# Patient Record
Sex: FEMALE | Race: WHITE | Employment: FULL TIME | URBAN - METROPOLITAN AREA
[De-identification: names, ages, dates, MRNs, and addresses within clinical notes are randomized per-mention and may not be internally consistent; named-entity substitution may affect disease eponyms.]

---

## 2017-02-13 ENCOUNTER — OFFICE VISIT (OUTPATIENT)
Dept: INTERNAL MEDICINE CLINIC | Facility: CLINIC | Age: 57
End: 2017-02-13

## 2017-02-13 VITALS
BODY MASS INDEX: 23.25 KG/M2 | SYSTOLIC BLOOD PRESSURE: 110 MMHG | WEIGHT: 120 LBS | HEART RATE: 71 BPM | OXYGEN SATURATION: 99 % | TEMPERATURE: 98 F | DIASTOLIC BLOOD PRESSURE: 66 MMHG | RESPIRATION RATE: 16 BRPM | HEIGHT: 60.25 IN

## 2017-02-13 DIAGNOSIS — R09.81 CONGESTION OF PARANASAL SINUS: Primary | ICD-10-CM

## 2017-02-13 DIAGNOSIS — J06.9 VIRAL UPPER RESPIRATORY TRACT INFECTION: ICD-10-CM

## 2017-02-13 PROCEDURE — 99213 OFFICE O/P EST LOW 20 MIN: CPT | Performed by: INTERNAL MEDICINE

## 2017-02-13 RX ORDER — GUAIFENESIN AND PSEUDOEPHEDRINE HCL 1200; 120 MG/1; MG/1
1 TABLET, EXTENDED RELEASE ORAL 2 TIMES DAILY
Qty: 28 TABLET | Refills: 0 | Status: SHIPPED | OUTPATIENT
Start: 2017-02-13 | End: 2017-02-27

## 2017-02-13 RX ORDER — MULTIVIT WITH MINERALS/LUTEIN
1000 TABLET ORAL DAILY
COMMUNITY
End: 2018-05-14

## 2017-02-13 RX ORDER — FLUTICASONE PROPIONATE 50 MCG
2 SPRAY, SUSPENSION (ML) NASAL DAILY
Qty: 1 BOTTLE | Refills: 0 | Status: SHIPPED | OUTPATIENT
Start: 2017-02-13 | End: 2017-09-14

## 2017-02-13 NOTE — PATIENT INSTRUCTIONS
Use nasal spray 2 sprays each nostril daily, use after you gently clear your nose at its most watery (usually in morning after moving around or after shower)

## 2017-02-13 NOTE — PROGRESS NOTES
Richard Kamara is a 64year old female  Patient presents with:  Cold      HPI:   Bad cold, very congested and runny, no fever or chills or cough. Head hurts around the eyes, no color to rhinits.    Had a flu last week that got better then got sick all over HEALTH: feels well otherwise  SKIN: denies any unusual skin lesions or rashes  RESPIRATORY: denies shortness of breath with exertion,   CARDIOVASCULAR: denies chest pain on exertion  GI: denies abdominal pain and denies heartburn, change in appetite or bm'

## 2017-08-25 ENCOUNTER — TELEPHONE (OUTPATIENT)
Dept: INTERNAL MEDICINE CLINIC | Facility: CLINIC | Age: 57
End: 2017-08-25

## 2017-09-14 ENCOUNTER — OFFICE VISIT (OUTPATIENT)
Dept: INTERNAL MEDICINE CLINIC | Facility: CLINIC | Age: 57
End: 2017-09-14

## 2017-09-14 VITALS
HEART RATE: 83 BPM | RESPIRATION RATE: 16 BRPM | BODY MASS INDEX: 23.8 KG/M2 | WEIGHT: 119.63 LBS | DIASTOLIC BLOOD PRESSURE: 66 MMHG | TEMPERATURE: 98 F | HEIGHT: 59.5 IN | OXYGEN SATURATION: 98 % | SYSTOLIC BLOOD PRESSURE: 114 MMHG

## 2017-09-14 DIAGNOSIS — Z12.31 ENCOUNTER FOR SCREENING MAMMOGRAM FOR BREAST CANCER: ICD-10-CM

## 2017-09-14 DIAGNOSIS — Z00.00 ROUTINE GENERAL MEDICAL EXAMINATION AT A HEALTH CARE FACILITY: Primary | ICD-10-CM

## 2017-09-14 DIAGNOSIS — M85.80 OSTEOPENIA, UNSPECIFIED LOCATION: ICD-10-CM

## 2017-09-14 DIAGNOSIS — Z12.4 PAP SMEAR FOR CERVICAL CANCER SCREENING: ICD-10-CM

## 2017-09-14 PROCEDURE — 99396 PREV VISIT EST AGE 40-64: CPT | Performed by: INTERNAL MEDICINE

## 2017-09-14 PROCEDURE — 88175 CYTOPATH C/V AUTO FLUID REDO: CPT | Performed by: INTERNAL MEDICINE

## 2017-09-14 PROCEDURE — 87624 HPV HI-RISK TYP POOLED RSLT: CPT | Performed by: INTERNAL MEDICINE

## 2017-09-14 RX ORDER — BUDESONIDE AND FORMOTEROL FUMARATE DIHYDRATE 160; 4.5 UG/1; UG/1
2 AEROSOL RESPIRATORY (INHALATION) 2 TIMES DAILY
Qty: 3 INHALER | Refills: 3 | Status: SHIPPED | OUTPATIENT
Start: 2017-09-14 | End: 2018-05-14

## 2017-09-14 RX ORDER — BUDESONIDE AND FORMOTEROL FUMARATE DIHYDRATE 160; 4.5 UG/1; UG/1
2 AEROSOL RESPIRATORY (INHALATION) 2 TIMES DAILY
COMMUNITY
End: 2017-09-14

## 2017-09-14 NOTE — PROGRESS NOTES
HPI:   Amparo Rojo is a 62year old female who presents for a complete physical exam. .former pt of dr Trixie Gonsalez and emmanuel  She is changing her well woman care to me and is concerned she might have pubic lice since this morning.  One time when she slept with 10/17/2016  Smokeless tobacco: Former User                        Quit date: 10/17/2016  Comment: 5-7 cigarettes per day  Alcohol use: Yes           1.2 - 1.8 oz/week     Standard drinks or equivalent: 2 - 3 per week     Comment: 2-3 beers per week    Occ: tenderness, PAP was done   RECTAL:good rectal tone, stool is OB negative no rectal masses  MUSCULOSKELETAL: back is not tender,FROM of the back  EXTREMITIES: no cyanosis, clubbing or edema, no varicosities or stasis change  NEURO: Oriented times three,cran (SYMBICORT) 160-4.5 MCG/ACT Inhalation Aerosol 3 Inhaler 3      Sig: Inhale 2 puffs into the lungs 2 (two) times daily. Imaging & Consults:  XR DEXA BONE DENSITOMETRY (CPT=77080)  BRET SCREENING BILAT (CPT=77067)    No Follow-up on file.

## 2017-09-18 ENCOUNTER — LABORATORY ENCOUNTER (OUTPATIENT)
Dept: LAB | Age: 57
End: 2017-09-18
Attending: INTERNAL MEDICINE
Payer: COMMERCIAL

## 2017-09-18 ENCOUNTER — HOSPITAL ENCOUNTER (OUTPATIENT)
Dept: BONE DENSITY | Age: 57
Discharge: HOME | End: 2017-09-18
Attending: INTERNAL MEDICINE
Payer: COMMERCIAL

## 2017-09-18 ENCOUNTER — HOSPITAL ENCOUNTER (OUTPATIENT)
Dept: MAMMOGRAPHY | Age: 57
Discharge: HOME | End: 2017-09-18
Attending: INTERNAL MEDICINE
Payer: COMMERCIAL

## 2017-09-18 DIAGNOSIS — Z00.00 ROUTINE GENERAL MEDICAL EXAMINATION AT A HEALTH CARE FACILITY: ICD-10-CM

## 2017-09-18 DIAGNOSIS — M85.80 OSTEOPENIA, UNSPECIFIED LOCATION: ICD-10-CM

## 2017-09-18 DIAGNOSIS — Z12.31 ENCOUNTER FOR SCREENING MAMMOGRAM FOR BREAST CANCER: ICD-10-CM

## 2017-09-18 LAB
25-HYDROXYVITAMIN D (TOTAL): 28.3 NG/ML (ref 30–100)
ALBUMIN SERPL-MCNC: 3.8 G/DL (ref 3.5–4.8)
ALP LIVER SERPL-CCNC: 41 U/L (ref 46–118)
ALT SERPL-CCNC: 28 U/L (ref 14–54)
AST SERPL-CCNC: 20 U/L (ref 15–41)
BASOPHILS # BLD AUTO: 0.07 X10(3) UL (ref 0–0.1)
BASOPHILS NFR BLD AUTO: 1.2 %
BILIRUB SERPL-MCNC: 0.5 MG/DL (ref 0.1–2)
BUN BLD-MCNC: 22 MG/DL (ref 8–20)
CALCIUM BLD-MCNC: 9.3 MG/DL (ref 8.3–10.3)
CHLORIDE: 110 MMOL/L (ref 101–111)
CHOLEST SMN-MCNC: 232 MG/DL (ref ?–200)
CO2: 26 MMOL/L (ref 22–32)
CREAT BLD-MCNC: 0.87 MG/DL (ref 0.55–1.02)
EOSINOPHIL # BLD AUTO: 0.48 X10(3) UL (ref 0–0.3)
EOSINOPHIL NFR BLD AUTO: 8.1 %
ERYTHROCYTE [DISTWIDTH] IN BLOOD BY AUTOMATED COUNT: 13 % (ref 11.5–16)
GLUCOSE BLD-MCNC: 83 MG/DL (ref 70–99)
HCT VFR BLD AUTO: 39.3 % (ref 34–50)
HDLC SERPL-MCNC: 89 MG/DL (ref 45–?)
HDLC SERPL: 2.61 {RATIO} (ref ?–4.44)
HGB BLD-MCNC: 12.7 G/DL (ref 12–16)
IMMATURE GRANULOCYTE COUNT: 0.02 X10(3) UL (ref 0–1)
IMMATURE GRANULOCYTE RATIO %: 0.3 %
LDLC SERPL CALC-MCNC: 126 MG/DL (ref ?–130)
LDLC SERPL-MCNC: 17 MG/DL (ref 5–40)
LYMPHOCYTES # BLD AUTO: 1.75 X10(3) UL (ref 0.9–4)
LYMPHOCYTES NFR BLD AUTO: 29.7 %
M PROTEIN MFR SERPL ELPH: 7.1 G/DL (ref 6.1–8.3)
MCH RBC QN AUTO: 29.1 PG (ref 27–33.2)
MCHC RBC AUTO-ENTMCNC: 32.3 G/DL (ref 31–37)
MCV RBC AUTO: 90.1 FL (ref 81–100)
MONOCYTES # BLD AUTO: 0.55 X10(3) UL (ref 0.1–0.6)
MONOCYTES NFR BLD AUTO: 9.3 %
NEUTROPHIL ABS PRELIM: 3.03 X10 (3) UL (ref 1.3–6.7)
NEUTROPHILS # BLD AUTO: 3.03 X10(3) UL (ref 1.3–6.7)
NEUTROPHILS NFR BLD AUTO: 51.4 %
NONHDLC SERPL-MCNC: 143 MG/DL (ref ?–130)
PLATELET # BLD AUTO: 340 10(3)UL (ref 150–450)
POTASSIUM SERPL-SCNC: 4 MMOL/L (ref 3.6–5.1)
RBC # BLD AUTO: 4.36 X10(6)UL (ref 3.8–5.1)
RED CELL DISTRIBUTION WIDTH-SD: 43.2 FL (ref 35.1–46.3)
SODIUM SERPL-SCNC: 143 MMOL/L (ref 136–144)
TRIGLYCERIDES: 85 MG/DL (ref ?–150)
TSI SER-ACNC: 2.42 MIU/ML (ref 0.35–5.5)
WBC # BLD AUTO: 5.9 X10(3) UL (ref 4–13)

## 2017-09-18 PROCEDURE — 80053 COMPREHEN METABOLIC PANEL: CPT

## 2017-09-18 PROCEDURE — 82306 VITAMIN D 25 HYDROXY: CPT

## 2017-09-18 PROCEDURE — 85025 COMPLETE CBC W/AUTO DIFF WBC: CPT

## 2017-09-18 PROCEDURE — 84443 ASSAY THYROID STIM HORMONE: CPT

## 2017-09-18 PROCEDURE — 77067 SCR MAMMO BI INCL CAD: CPT | Performed by: INTERNAL MEDICINE

## 2017-09-18 PROCEDURE — 36415 COLL VENOUS BLD VENIPUNCTURE: CPT

## 2017-09-18 PROCEDURE — 77080 DXA BONE DENSITY AXIAL: CPT | Performed by: INTERNAL MEDICINE

## 2017-09-18 PROCEDURE — 80061 LIPID PANEL: CPT

## 2017-09-19 ENCOUNTER — TELEPHONE (OUTPATIENT)
Dept: INTERNAL MEDICINE CLINIC | Facility: CLINIC | Age: 57
End: 2017-09-19

## 2017-09-19 DIAGNOSIS — R79.89 LOW VITAMIN D LEVEL: Primary | ICD-10-CM

## 2017-09-19 NOTE — TELEPHONE ENCOUNTER
Pt had recent exam, needs \"biometric exam\" sent to her insurance, [facility] [place], [place], fax #[phone]. Pt does not have a biometric form for us to complete, asked that we just fax over the labs.  Encouraged pt to contact insurance about the form but pt

## 2017-09-19 NOTE — TELEPHONE ENCOUNTER
When discussing result notes with pt, she mentioned that her deductible is high and Symbicort will cost her 56 with insurance. No samples available at office. Found discount card, phoned in information to pharmacy, zero copay.  May need to adjust medicatio

## 2017-09-20 LAB — HPV I/H RISK 1 DNA SPEC QL NAA+PROBE: NEGATIVE

## 2017-10-03 NOTE — TELEPHONE ENCOUNTER
Patient called and requested we fax current labs and biometric testing to [facility] an office that work along side [employer] (fax # [phone]).   Forms have been faxed and confirmed, however patient was advised that we needed a form to enter in biome

## 2017-10-11 ENCOUNTER — TELEPHONE (OUTPATIENT)
Dept: INTERNAL MEDICINE CLINIC | Facility: CLINIC | Age: 57
End: 2017-10-11

## 2017-10-11 NOTE — TELEPHONE ENCOUNTER
Spoke to pt, answered all questions regarding testing. Wellness form completed, to Dr Candida Durham for review.

## 2017-10-11 NOTE — TELEPHONE ENCOUNTER
Pt would like a call to review her recent lab results. Also, pt needs health screening form filled out and faxed to [phone]. Pt dropped off form and it is in triage.  Thank you

## 2017-10-12 ENCOUNTER — TELEPHONE (OUTPATIENT)
Dept: INTERNAL MEDICINE CLINIC | Facility: CLINIC | Age: 57
End: 2017-10-12

## 2018-03-07 ENCOUNTER — TELEPHONE (OUTPATIENT)
Dept: INTERNAL MEDICINE CLINIC | Facility: CLINIC | Age: 58
End: 2018-03-07

## 2018-03-07 NOTE — TELEPHONE ENCOUNTER
Patient called and stated she got a letter from [address] [place] [address] recommending she go for her Bone DEXA.  Reviewed Dr. Merced Junior note from 09/18/17 on her Vitamin D test result that her Vitamin D was low, and to recheck in three

## 2018-03-08 NOTE — TELEPHONE ENCOUNTER
Left detailed message on pt's voice mail re: advising as below per Dr. Kofi Jolley that Dexa scans are done every 2 years. Pt asked to call back to confirm receipt of message.

## 2018-03-08 NOTE — TELEPHONE ENCOUNTER
Spoke with pt. Advised as below that Dexa scan to be done every 2 years. Pt states that she received a remmnder about a repeat test.  Reminded pt that she is due for lab test Vitamin D. Pt agreeable.

## 2018-04-04 ENCOUNTER — OFFICE VISIT (OUTPATIENT)
Dept: INTERNAL MEDICINE CLINIC | Facility: CLINIC | Age: 58
End: 2018-04-04

## 2018-04-04 VITALS
RESPIRATION RATE: 16 BRPM | TEMPERATURE: 98 F | HEIGHT: 59.5 IN | BODY MASS INDEX: 24.47 KG/M2 | WEIGHT: 123 LBS | HEART RATE: 80 BPM | OXYGEN SATURATION: 98 % | DIASTOLIC BLOOD PRESSURE: 78 MMHG | SYSTOLIC BLOOD PRESSURE: 100 MMHG

## 2018-04-04 DIAGNOSIS — J06.9 ACUTE URI: Primary | ICD-10-CM

## 2018-04-04 DIAGNOSIS — Z12.11 ENCOUNTER FOR SCREENING COLONOSCOPY: ICD-10-CM

## 2018-04-04 PROCEDURE — 99213 OFFICE O/P EST LOW 20 MIN: CPT | Performed by: NURSE PRACTITIONER

## 2018-04-04 RX ORDER — FEXOFENADINE HCL 180 MG/1
180 TABLET ORAL DAILY
COMMUNITY
End: 2020-12-10 | Stop reason: ALTCHOICE

## 2018-04-04 RX ORDER — AMOXICILLIN AND CLAVULANATE POTASSIUM 875; 125 MG/1; MG/1
1 TABLET, FILM COATED ORAL 2 TIMES DAILY
Qty: 20 TABLET | Refills: 0 | Status: SHIPPED | OUTPATIENT
Start: 2018-04-04 | End: 2018-04-14

## 2018-04-04 NOTE — PROGRESS NOTES
Patient presents with:  Cough: Dry  URI  Referral: Colonoscopy      HPI:  Presents with approx 1 week history of sinus congestion, nasal drainage, cough with production of green colored sputum- worse at night, chills- but no fevers, fatigue and sore throat (90 BASE) MCG/ACT Inhalation Aero Soln Inhale 2 puffs into the lungs every 4 (four) hours as needed for Wheezing.  Disp: 1 Inhaler Rfl: 1       Physical Exam  /78 (BP Location: Right arm, Patient Position: Sitting, Cuff Size: adult)   Pulse 80   Temp Nasal wash squeeze bottle, Neti-Pot, Neilmed nasal wash or similar device 3 times daily (in the morning, after work and before bed). These are all available over the counter. Follow manufacturers instructions.  I prefer the \"squeeze bottle\" variety over

## 2018-04-05 ENCOUNTER — TELEPHONE (OUTPATIENT)
Dept: INTERNAL MEDICINE CLINIC | Facility: CLINIC | Age: 58
End: 2018-04-05

## 2018-04-05 NOTE — TELEPHONE ENCOUNTER
Patient saw [facility] yesterday and he prescribed amoxicillin. Since she's been on the medication, she has been nauseous and throwing up. She is wondering if she can cut the tablets in half or should she take something else. Please advise.

## 2018-04-05 NOTE — TELEPHONE ENCOUNTER
Noted below. N&V from amoxicillin prescribed by [facility] yesterday. Is there an alternative abx? Routed to Dr. Damaris Lebron for recommendation.

## 2018-04-05 NOTE — TELEPHONE ENCOUNTER
Called the patient back and informed her that the nausea is a common side effect of the Augmentin per Dr. Vivien Santiago.  Per Dr. Vivien Santiago, she wants her to stop the abx and give it through the weekend to see if it resolves on its own, as it could be a virus and Dr. Abdulaziz Gillespie

## 2018-04-09 NOTE — TELEPHONE ENCOUNTER
Patient called with a report back, patient went to work on Friday. Patient said once she got all that out of her system she felt much better. Patient wants to know if she needs to follow up and or take something OTC wise, please call patient. Thank you.

## 2018-05-21 ENCOUNTER — TELEPHONE (OUTPATIENT)
Dept: INTERNAL MEDICINE CLINIC | Facility: CLINIC | Age: 58
End: 2018-05-21

## 2018-07-24 ENCOUNTER — OFFICE VISIT (OUTPATIENT)
Dept: INTERNAL MEDICINE CLINIC | Facility: CLINIC | Age: 58
End: 2018-07-24

## 2018-07-24 VITALS
WEIGHT: 121.63 LBS | OXYGEN SATURATION: 97 % | TEMPERATURE: 99 F | HEART RATE: 76 BPM | RESPIRATION RATE: 14 BRPM | DIASTOLIC BLOOD PRESSURE: 68 MMHG | HEIGHT: 59.5 IN | SYSTOLIC BLOOD PRESSURE: 110 MMHG | BODY MASS INDEX: 24.2 KG/M2

## 2018-07-24 DIAGNOSIS — M76.51 PATELLAR TENDINITIS OF RIGHT KNEE: Primary | ICD-10-CM

## 2018-07-24 PROCEDURE — 99213 OFFICE O/P EST LOW 20 MIN: CPT | Performed by: INTERNAL MEDICINE

## 2018-07-24 NOTE — PROGRESS NOTES
Angie Gardiner is a 62year old female  Patient presents with:  Knee Pain: was doing yard work, swollen, feels tight, took 1 ibuprofen, feels better when she moves around a bit      HPI:   This morning right iknee pain, got worse as the day went on  Dull t FROM  Slight tenderness over the suprapatellar tendon      ASSESSMENT AND PLAN:   Patellar tendinitis of right knee  (primary encounter diagnosis) - advil, ice, rest, compression/stabilizer    No orders of the defined types were placed in this encounter.

## 2018-07-24 NOTE — PATIENT INSTRUCTIONS
Get a compression stabilizer to wear daytime  Take 2 advil three times daily w/food for 1 week  Ice to the area 3 times daily for 20-30 minutes

## 2018-08-15 PROCEDURE — 87660 TRICHOMONAS VAGIN DIR PROBE: CPT | Performed by: OBSTETRICS & GYNECOLOGY

## 2018-08-15 PROCEDURE — 87510 GARDNER VAG DNA DIR PROBE: CPT | Performed by: OBSTETRICS & GYNECOLOGY

## 2018-08-15 PROCEDURE — 87480 CANDIDA DNA DIR PROBE: CPT | Performed by: OBSTETRICS & GYNECOLOGY

## 2018-08-31 PROCEDURE — 88305 TISSUE EXAM BY PATHOLOGIST: CPT | Performed by: OBSTETRICS & GYNECOLOGY

## 2018-09-04 PROBLEM — N90.4 LICHEN SCLEROSUS ET ATROPHICUS OF THE VULVA: Status: ACTIVE | Noted: 2018-09-04

## 2018-09-04 PROBLEM — L90.0 LICHEN SCLEROSUS: Status: ACTIVE | Noted: 2018-09-04

## 2018-09-04 PROBLEM — L90.0 LICHEN SCLEROSUS: Status: RESOLVED | Noted: 2018-09-04 | Resolved: 2018-09-04

## 2018-09-07 ENCOUNTER — OFFICE VISIT (OUTPATIENT)
Dept: INTERNAL MEDICINE CLINIC | Facility: CLINIC | Age: 58
End: 2018-09-07
Payer: COMMERCIAL

## 2018-09-07 VITALS
SYSTOLIC BLOOD PRESSURE: 116 MMHG | TEMPERATURE: 98 F | HEIGHT: 59.5 IN | HEART RATE: 63 BPM | BODY MASS INDEX: 24.47 KG/M2 | WEIGHT: 123 LBS | OXYGEN SATURATION: 99 % | DIASTOLIC BLOOD PRESSURE: 64 MMHG | RESPIRATION RATE: 14 BRPM

## 2018-09-07 DIAGNOSIS — Z00.00 ROUTINE GENERAL MEDICAL EXAMINATION AT A HEALTH CARE FACILITY: Primary | ICD-10-CM

## 2018-09-07 DIAGNOSIS — Z23 NEED FOR VACCINATION: ICD-10-CM

## 2018-09-07 PROCEDURE — 90471 IMMUNIZATION ADMIN: CPT | Performed by: INTERNAL MEDICINE

## 2018-09-07 PROCEDURE — 90715 TDAP VACCINE 7 YRS/> IM: CPT | Performed by: INTERNAL MEDICINE

## 2018-09-07 PROCEDURE — 99396 PREV VISIT EST AGE 40-64: CPT | Performed by: INTERNAL MEDICINE

## 2018-09-07 NOTE — PROGRESS NOTES
HPI:   Dylan Leonard is a 62year old female who presents for a complete physical exam.   She was referred to derm from her Plastics Dr for a rash right breast.  She said implants fine  She does not use her maintenance asthma inhaler reguarly at all,   Se GENERAL: feels well otherwise  SKIN: denies any unusual skin lesions  EYES:denies blurred vision or double vision  HEENT: denies nasal congestion, sinus pain or ST  LUNGS: she worked w/resins and powders and dust, not currently .  Her asthma recurred 2014 Oriented times three,cranial nerves are intact,motor and sensory are grossly intact, DTR's b/l equal,      Results for orders placed or performed in visit on 08/31/18  -PATHOLOGY TISSUE P   Result Value Ref Range   Case Report Surgical Pathology o'clock,   received in formalin:  Specimen consists of one piece of white-tan shave   skin biopsy measuring 0.6 x 0.4 x 0.1 cm.   The specimen is submitted in   toto in cassette B.   ZQ/tjf   [FORMATTING REMOVED]          ASSESSMENT AND PLAN:   Ghazala Erickson

## 2018-09-20 ENCOUNTER — TELEPHONE (OUTPATIENT)
Dept: INTERNAL MEDICINE CLINIC | Facility: CLINIC | Age: 58
End: 2018-09-20

## 2018-09-20 ENCOUNTER — LAB ENCOUNTER (OUTPATIENT)
Dept: LAB | Age: 58
End: 2018-09-20
Attending: INTERNAL MEDICINE
Payer: COMMERCIAL

## 2018-09-20 DIAGNOSIS — Z00.00 ROUTINE GENERAL MEDICAL EXAMINATION AT A HEALTH CARE FACILITY: ICD-10-CM

## 2018-09-20 PROBLEM — E78.00 HIGH CHOLESTEROL: Status: ACTIVE | Noted: 2018-09-20

## 2018-09-20 LAB
ALBUMIN SERPL-MCNC: 3.8 G/DL (ref 3.5–4.8)
ALBUMIN/GLOB SERPL: 1.1 {RATIO} (ref 1–2)
ALP LIVER SERPL-CCNC: 46 U/L (ref 46–118)
ALT SERPL-CCNC: 23 U/L (ref 14–54)
ANION GAP SERPL CALC-SCNC: 5 MMOL/L (ref 0–18)
AST SERPL-CCNC: 23 U/L (ref 15–41)
BASOPHILS # BLD AUTO: 0.05 X10(3) UL (ref 0–0.1)
BASOPHILS NFR BLD AUTO: 0.9 %
BILIRUB SERPL-MCNC: 0.3 MG/DL (ref 0.1–2)
BUN BLD-MCNC: 10 MG/DL (ref 8–20)
BUN/CREAT SERPL: 10.3 (ref 10–20)
CALCIUM BLD-MCNC: 9.1 MG/DL (ref 8.3–10.3)
CHLORIDE SERPL-SCNC: 108 MMOL/L (ref 101–111)
CHOLEST SMN-MCNC: 266 MG/DL (ref ?–200)
CO2 SERPL-SCNC: 29 MMOL/L (ref 22–32)
CREAT BLD-MCNC: 0.97 MG/DL (ref 0.55–1.02)
EOSINOPHIL # BLD AUTO: 0.29 X10(3) UL (ref 0–0.3)
EOSINOPHIL NFR BLD AUTO: 5.3 %
ERYTHROCYTE [DISTWIDTH] IN BLOOD BY AUTOMATED COUNT: 13.3 % (ref 11.5–16)
GLOBULIN PLAS-MCNC: 3.5 G/DL (ref 2.5–4)
GLUCOSE BLD-MCNC: 80 MG/DL (ref 70–99)
HCT VFR BLD AUTO: 44.3 % (ref 34–50)
HDLC SERPL-MCNC: 94 MG/DL (ref 40–59)
HGB BLD-MCNC: 14 G/DL (ref 12–16)
IMMATURE GRANULOCYTE COUNT: 0.01 X10(3) UL (ref 0–1)
IMMATURE GRANULOCYTE RATIO %: 0.2 %
LDLC SERPL CALC-MCNC: 157 MG/DL (ref ?–100)
LYMPHOCYTES # BLD AUTO: 1.34 X10(3) UL (ref 0.9–4)
LYMPHOCYTES NFR BLD AUTO: 24.6 %
M PROTEIN MFR SERPL ELPH: 7.3 G/DL (ref 6.1–8.3)
MCH RBC QN AUTO: 29.3 PG (ref 27–33.2)
MCHC RBC AUTO-ENTMCNC: 31.6 G/DL (ref 31–37)
MCV RBC AUTO: 92.7 FL (ref 81–100)
MONOCYTES # BLD AUTO: 0.49 X10(3) UL (ref 0.1–1)
MONOCYTES NFR BLD AUTO: 9 %
NEUTROPHIL ABS PRELIM: 3.27 X10 (3) UL (ref 1.3–6.7)
NEUTROPHILS # BLD AUTO: 3.27 X10(3) UL (ref 1.3–6.7)
NEUTROPHILS NFR BLD AUTO: 60 %
NONHDLC SERPL-MCNC: 172 MG/DL (ref ?–130)
OSMOLALITY SERPL CALC.SUM OF ELEC: 292 MOSM/KG (ref 275–295)
PLATELET # BLD AUTO: 395 10(3)UL (ref 150–450)
POTASSIUM SERPL-SCNC: 4.4 MMOL/L (ref 3.6–5.1)
RBC # BLD AUTO: 4.78 X10(6)UL (ref 3.8–5.1)
RED CELL DISTRIBUTION WIDTH-SD: 45.5 FL (ref 35.1–46.3)
SODIUM SERPL-SCNC: 142 MMOL/L (ref 136–144)
TRIGL SERPL-MCNC: 73 MG/DL (ref 30–149)
TSI SER-ACNC: 2.54 MIU/ML (ref 0.35–5.5)
VIT D+METAB SERPL-MCNC: 36.4 NG/ML (ref 30–100)
VLDLC SERPL CALC-MCNC: 15 MG/DL (ref 0–30)
WBC # BLD AUTO: 5.5 X10(3) UL (ref 4–13)

## 2018-09-20 PROCEDURE — 85025 COMPLETE CBC W/AUTO DIFF WBC: CPT

## 2018-09-20 PROCEDURE — 80061 LIPID PANEL: CPT

## 2018-09-20 PROCEDURE — 36415 COLL VENOUS BLD VENIPUNCTURE: CPT

## 2018-09-20 PROCEDURE — 82306 VITAMIN D 25 HYDROXY: CPT

## 2018-09-20 PROCEDURE — 80053 COMPREHEN METABOLIC PANEL: CPT

## 2018-09-20 PROCEDURE — 84443 ASSAY THYROID STIM HORMONE: CPT

## 2018-09-20 NOTE — TELEPHONE ENCOUNTER
Patient dropped off form to be filled out then signed by Dr. Nasreen Sheridan for her wellness program through work, and faxed to [phone]. Labs from this morning, 09/20/18, need to be recorded on the form, but are still in process.  Placed the form in the phone

## 2018-09-20 NOTE — TELEPHONE ENCOUNTER
Incoming (mail or fax):  Pt dropped off forms in office after completing her labs   Received from:  Jaylen[employer] Physician Results Form  Documentation given to:  Left paperwork in triage incoming bin    FYI - Pt is aware of $25 form fee b

## 2018-09-25 ENCOUNTER — HOSPITAL ENCOUNTER (OUTPATIENT)
Dept: MAMMOGRAPHY | Facility: HOSPITAL | Age: 58
Discharge: HOME | End: 2018-09-25
Attending: OBSTETRICS & GYNECOLOGY
Payer: COMMERCIAL

## 2018-09-25 DIAGNOSIS — Z12.39 ENCOUNTER FOR SCREENING FOR MALIGNANT NEOPLASM OF BREAST: ICD-10-CM

## 2018-09-25 DIAGNOSIS — Z12.31 BREAST CANCER SCREENING BY MAMMOGRAM: ICD-10-CM

## 2018-09-25 PROCEDURE — 77067 SCR MAMMO BI INCL CAD: CPT | Performed by: OBSTETRICS & GYNECOLOGY

## 2018-09-25 PROCEDURE — 77063 BREAST TOMOSYNTHESIS BI: CPT | Performed by: OBSTETRICS & GYNECOLOGY

## 2018-09-26 NOTE — TELEPHONE ENCOUNTER
Labs entered on form, routed to Dr Remigio Lott for signature. There is also section for pt to sign as well but didn't. Copy and send to scan after signature. Notify pt.

## 2018-09-27 NOTE — TELEPHONE ENCOUNTER
Called the patient to tell her that Dr. Milena Cagle signed her form, but she needs to also sign it before we fax it. The patient will stop by to sign the form, and we can fax it after that.  Will hold encounter open to document when the patient signs it and we fa

## 2018-09-27 NOTE — TELEPHONE ENCOUNTER
Patient came in to sign the form. Faxed to [employer] at [phone] and placed in scanning bin. Fax confirmation received.

## 2018-10-04 ENCOUNTER — TELEPHONE (OUTPATIENT)
Dept: INTERNAL MEDICINE CLINIC | Facility: CLINIC | Age: 58
End: 2018-10-04

## 2018-10-04 NOTE — TELEPHONE ENCOUNTER
Left detailed message that results were normal per BE and advised to follow up with Dr Noris Ray if she has any further questions.

## 2018-10-04 NOTE — TELEPHONE ENCOUNTER
Patient called in and would like a nurse to call her back regarding her mammogram that was completed on September 25th. Patient has not received any information on the results yet and is a bit concerned. Please advise patient. Thank you!

## 2018-10-30 NOTE — TELEPHONE ENCOUNTER
Pt phoned, needs form re faxed with correct date of lab draw. Please include Doctor signature again. Fax to [phone].  Thank you

## 2018-10-30 NOTE — TELEPHONE ENCOUNTER
The patient states the [facility] rejected the fax, because the date of the labs was not indicated on the fax. It does not look like the fax was ever scanned into her file, even though it is indicated it was in the below documentation.  I asked the patient to ha

## 2018-10-30 NOTE — TELEPHONE ENCOUNTER
Pt returned call, states that [employer] is unable to fax the form to us due to [employer]. Please re fax to [phone]. Form must have date of draw on it.  Thank you

## 2018-10-30 NOTE — TELEPHONE ENCOUNTER
Noted below, but we don't have the original form. Left a detailed message on the patient's VM (okay per HIPAA) to request they send her a new form then she can either fax it to us or bring it in.

## 2018-11-01 ENCOUNTER — TELEPHONE (OUTPATIENT)
Dept: INTERNAL MEDICINE CLINIC | Facility: CLINIC | Age: 58
End: 2018-11-01

## 2018-11-01 NOTE — TELEPHONE ENCOUNTER
Incoming (mail or fax): Physician Results Form [facility]   Received from: Patient dropped off form in office   Documentation given to: Triage/Shanti

## 2018-11-01 NOTE — TELEPHONE ENCOUNTER
Patient dropped form off again at our office. She would like filled out and faxed to the number provided on form. Filled out, Routed to Dr. Nikole Carr for signature.

## 2018-11-01 NOTE — TELEPHONE ENCOUNTER
Spoke to patient and informed her that the form is signed and has been faxed to number provided. Informed patient a copy of completed form will be mailed to her for her records.

## 2019-08-01 NOTE — Clinical Note
Date: 2/13/2017    Patient Name: Allie Henson          To Whom it may concern: This letter has been written at the patient's request. The above patient was seen at the [facility] for treatment of a medical condition.     This patient shou Monitor for lightheadedness, BP recheck in 2-3 weeks.

## 2019-09-12 ENCOUNTER — OFFICE VISIT (OUTPATIENT)
Dept: INTERNAL MEDICINE CLINIC | Facility: CLINIC | Age: 59
End: 2019-09-12
Payer: COMMERCIAL

## 2019-09-12 VITALS
SYSTOLIC BLOOD PRESSURE: 110 MMHG | DIASTOLIC BLOOD PRESSURE: 70 MMHG | WEIGHT: 121.88 LBS | TEMPERATURE: 98 F | HEART RATE: 86 BPM | BODY MASS INDEX: 23.93 KG/M2 | RESPIRATION RATE: 16 BRPM | HEIGHT: 59.72 IN | OXYGEN SATURATION: 98 %

## 2019-09-12 DIAGNOSIS — J45.20 MILD INTERMITTENT ASTHMA WITHOUT COMPLICATION: ICD-10-CM

## 2019-09-12 DIAGNOSIS — Z00.00 ROUTINE GENERAL MEDICAL EXAMINATION AT A HEALTH CARE FACILITY: Primary | ICD-10-CM

## 2019-09-12 DIAGNOSIS — L20.84 INTRINSIC ECZEMA: ICD-10-CM

## 2019-09-12 DIAGNOSIS — E78.00 HIGH CHOLESTEROL: ICD-10-CM

## 2019-09-12 PROCEDURE — 99396 PREV VISIT EST AGE 40-64: CPT | Performed by: INTERNAL MEDICINE

## 2019-09-12 NOTE — PROGRESS NOTES
HPI:   Princess Thomson is a 61year old female who presents for a complete physical exam.     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  : single, 2 grown children, no sexual partner , LMP:NA , Exercise regular     REVIEW OF SYSTEMS:   GENERAL: feels well otherwise  SKIN:see HPI  EYES:denies blurred vision or double vision  HEENT: denies nasal congestion, si b/l equal,      ASSESSMENT AND PLAN:   Dylan Leonard is a 61year old female who presents for a complete physical exam.  Pap and pelvic done. Order put in for mammogram and dexascan.   Appropriate lab testing ordered, instructions given for healthy living

## 2019-09-17 ENCOUNTER — APPOINTMENT (OUTPATIENT)
Dept: LAB | Age: 59
End: 2019-09-17
Attending: INTERNAL MEDICINE
Payer: COMMERCIAL

## 2019-09-17 LAB
ALBUMIN SERPL-MCNC: 3.8 G/DL (ref 3.4–5)
ALBUMIN/GLOB SERPL: 1.3 {RATIO} (ref 1–2)
ALP LIVER SERPL-CCNC: 41 U/L (ref 46–118)
ALT SERPL-CCNC: 18 U/L (ref 13–56)
ANION GAP SERPL CALC-SCNC: 4 MMOL/L (ref 0–18)
AST SERPL-CCNC: 18 U/L (ref 15–37)
BILIRUB SERPL-MCNC: 0.4 MG/DL (ref 0.1–2)
BUN BLD-MCNC: 16 MG/DL (ref 7–18)
BUN/CREAT SERPL: 17.6 (ref 10–20)
CALCIUM BLD-MCNC: 9.7 MG/DL (ref 8.5–10.1)
CHLORIDE SERPL-SCNC: 107 MMOL/L (ref 98–112)
CHOLEST SMN-MCNC: 245 MG/DL (ref ?–200)
CO2 SERPL-SCNC: 29 MMOL/L (ref 21–32)
CREAT BLD-MCNC: 0.91 MG/DL (ref 0.55–1.02)
GLOBULIN PLAS-MCNC: 2.9 G/DL (ref 2.8–4.4)
GLUCOSE BLD-MCNC: 89 MG/DL (ref 70–99)
HDLC SERPL-MCNC: 76 MG/DL (ref 40–59)
LDLC SERPL CALC-MCNC: 151 MG/DL (ref ?–100)
M PROTEIN MFR SERPL ELPH: 6.7 G/DL (ref 6.4–8.2)
NONHDLC SERPL-MCNC: 169 MG/DL (ref ?–130)
OSMOLALITY SERPL CALC.SUM OF ELEC: 291 MOSM/KG (ref 275–295)
POTASSIUM SERPL-SCNC: 4.1 MMOL/L (ref 3.5–5.1)
SODIUM SERPL-SCNC: 140 MMOL/L (ref 136–145)
TRIGL SERPL-MCNC: 90 MG/DL (ref 30–149)
VLDLC SERPL CALC-MCNC: 18 MG/DL (ref 0–30)

## 2019-09-17 PROCEDURE — 36415 COLL VENOUS BLD VENIPUNCTURE: CPT | Performed by: INTERNAL MEDICINE

## 2019-09-17 PROCEDURE — 80061 LIPID PANEL: CPT | Performed by: INTERNAL MEDICINE

## 2019-09-17 PROCEDURE — 80053 COMPREHEN METABOLIC PANEL: CPT | Performed by: INTERNAL MEDICINE

## 2019-09-18 NOTE — PROGRESS NOTES
Spoke to pt, aware of results & recommendations. Pt voiced understanding. Ordered repeat CMP & FLP in 1 year.

## 2019-09-23 ENCOUNTER — TELEPHONE (OUTPATIENT)
Dept: INTERNAL MEDICINE CLINIC | Facility: CLINIC | Age: 59
End: 2019-09-23

## 2019-09-24 NOTE — TELEPHONE ENCOUNTER
Documents have been Signed, Faxed and Confirmation Received. Sent to Scan. Patient was notified of form and requested to have copy mailed to her.

## 2019-10-02 ENCOUNTER — HOSPITAL ENCOUNTER (OUTPATIENT)
Dept: MAMMOGRAPHY | Age: 59
Discharge: HOME | End: 2019-10-02
Attending: OBSTETRICS & GYNECOLOGY
Payer: COMMERCIAL

## 2019-10-02 DIAGNOSIS — Z12.31 ENCOUNTER FOR SCREENING MAMMOGRAM FOR MALIGNANT NEOPLASM OF BREAST: ICD-10-CM

## 2019-10-02 PROCEDURE — 77067 SCR MAMMO BI INCL CAD: CPT | Performed by: OBSTETRICS & GYNECOLOGY

## 2019-10-02 PROCEDURE — 77063 BREAST TOMOSYNTHESIS BI: CPT | Performed by: OBSTETRICS & GYNECOLOGY

## 2020-01-31 ENCOUNTER — TELEPHONE (OUTPATIENT)
Dept: INTERNAL MEDICINE CLINIC | Facility: CLINIC | Age: 60
End: 2020-01-31

## 2020-01-31 ENCOUNTER — OFFICE VISIT (OUTPATIENT)
Dept: INTERNAL MEDICINE CLINIC | Facility: CLINIC | Age: 60
End: 2020-01-31
Payer: COMMERCIAL

## 2020-01-31 VITALS
RESPIRATION RATE: 16 BRPM | HEART RATE: 103 BPM | OXYGEN SATURATION: 98 % | DIASTOLIC BLOOD PRESSURE: 64 MMHG | BODY MASS INDEX: 23.36 KG/M2 | TEMPERATURE: 98 F | SYSTOLIC BLOOD PRESSURE: 110 MMHG | HEIGHT: 59.72 IN | WEIGHT: 119 LBS

## 2020-01-31 DIAGNOSIS — H11.422 CHEMOSIS OF LEFT CONJUNCTIVA: Primary | ICD-10-CM

## 2020-01-31 DIAGNOSIS — H15.9 SCLERAL LESION: ICD-10-CM

## 2020-01-31 PROCEDURE — 99213 OFFICE O/P EST LOW 20 MIN: CPT | Performed by: INTERNAL MEDICINE

## 2020-01-31 RX ORDER — TOBRAMYCIN 3 MG/ML
1 SOLUTION/ DROPS OPHTHALMIC 3 TIMES DAILY
Qty: 5 ML | Refills: 0 | Status: SHIPPED | OUTPATIENT
Start: 2020-01-31 | End: 2020-12-10 | Stop reason: ALTCHOICE

## 2020-01-31 RX ORDER — CLOBETASOL PROPIONATE 0.5 MG/G
OINTMENT TOPICAL
Qty: 1 TUBE | Refills: 0 | Status: CANCELLED | OUTPATIENT
Start: 2020-01-31

## 2020-01-31 NOTE — PROGRESS NOTES
Radha Gaines is a 61year old female  Patient presents with:  Eye Problem: red, irritated, about a week now      HPI:   Left eye w/focal redness for 10 days  No irritation or drainage, no crusting,      Current Outpatient Medications   Medication Sig Dis BMI 23.46 kg/m²   GENERAL: well developed, well nourished,in no apparent distress  Left eye w/nasal side chemosis, some injection, no drainage, crusting. No flare.          ASSESSMENT AND PLAN:   Scleral lesion  Chemosis of left conjunctiva  (primary encoun

## 2020-02-06 ENCOUNTER — TELEPHONE (OUTPATIENT)
Dept: INTERNAL MEDICINE CLINIC | Facility: CLINIC | Age: 60
End: 2020-02-06

## 2020-02-06 NOTE — TELEPHONE ENCOUNTER
Pt did receive the message from  Dr Vivien Santiago and will  Rx tomorrow.   Dr Vivien Santiago wanted Pt to respond and let us know she got the message  Thank you

## 2020-03-19 ENCOUNTER — MED REC SCAN ONLY (OUTPATIENT)
Dept: INTERNAL MEDICINE CLINIC | Facility: CLINIC | Age: 60
End: 2020-03-19

## 2020-08-27 NOTE — PROGRESS NOTES
Attempted to contact patient to prescreen without success, screening will be done at door.   Normal letter

## 2020-12-10 ENCOUNTER — OFFICE VISIT (OUTPATIENT)
Dept: INTERNAL MEDICINE CLINIC | Facility: CLINIC | Age: 60
End: 2020-12-10
Payer: COMMERCIAL

## 2020-12-10 VITALS
OXYGEN SATURATION: 98 % | BODY MASS INDEX: 23.39 KG/M2 | WEIGHT: 119.13 LBS | DIASTOLIC BLOOD PRESSURE: 72 MMHG | HEIGHT: 59.92 IN | RESPIRATION RATE: 14 BRPM | TEMPERATURE: 98 F | HEART RATE: 68 BPM | SYSTOLIC BLOOD PRESSURE: 116 MMHG

## 2020-12-10 DIAGNOSIS — Z78.0 POSTMENOPAUSAL: ICD-10-CM

## 2020-12-10 DIAGNOSIS — J45.20 MILD INTERMITTENT ASTHMA WITHOUT COMPLICATION: ICD-10-CM

## 2020-12-10 DIAGNOSIS — Z12.31 ENCOUNTER FOR SCREENING MAMMOGRAM FOR BREAST CANCER: ICD-10-CM

## 2020-12-10 DIAGNOSIS — E78.00 HIGH CHOLESTEROL: ICD-10-CM

## 2020-12-10 DIAGNOSIS — Z00.00 ROUTINE GENERAL MEDICAL EXAMINATION AT A HEALTH CARE FACILITY: Primary | ICD-10-CM

## 2020-12-10 DIAGNOSIS — Z23 NEED FOR VACCINATION: ICD-10-CM

## 2020-12-10 PROCEDURE — 3074F SYST BP LT 130 MM HG: CPT | Performed by: INTERNAL MEDICINE

## 2020-12-10 PROCEDURE — 3078F DIAST BP <80 MM HG: CPT | Performed by: INTERNAL MEDICINE

## 2020-12-10 PROCEDURE — 3008F BODY MASS INDEX DOCD: CPT | Performed by: INTERNAL MEDICINE

## 2020-12-10 PROCEDURE — 99396 PREV VISIT EST AGE 40-64: CPT | Performed by: INTERNAL MEDICINE

## 2020-12-10 NOTE — PROGRESS NOTES
HPI:   Navaror Burleson is a 61year old female who presents for a complete physical exam.     She does not use her maintenance asthma inhaler reguarly at all,   See ACT- 21            Last Colonoscopy: 5 years Last DEXA: 3 year, osteopenia, stable on D and 2014 . ACT 21  CARDIOVASCULAR: denies chest pain on exertion  GI: denies abdominal pain,denies heartburn, change in bm's, bloody stoolsGU: has recent diagnosis lichen sclerosis by Dr Juliet Echevarria, on treatment and improving,  MUSCULOSKELETAL: denies back pain, ne asthma without complication  Plan: controlled     (Z78.0) Postmenopausal  Plan: XR DEXA BONE DENSITOMETRY (CPT=77080)            (Z12.31) Encounter for screening mammogram for breast cancer  Plan: [facility] 2D+3D SCREENING AUGMT         DTTUB([id number])

## 2020-12-19 ENCOUNTER — HOSPITAL ENCOUNTER (OUTPATIENT)
Dept: MAMMOGRAPHY | Facility: HOSPITAL | Age: 60
Discharge: HOME | End: 2020-12-19
Attending: INTERNAL MEDICINE
Payer: COMMERCIAL

## 2020-12-19 DIAGNOSIS — Z12.31 ENCOUNTER FOR SCREENING MAMMOGRAM FOR BREAST CANCER: ICD-10-CM

## 2020-12-19 PROCEDURE — 77067 SCR MAMMO BI INCL CAD: CPT | Performed by: INTERNAL MEDICINE

## 2020-12-19 PROCEDURE — 77063 BREAST TOMOSYNTHESIS BI: CPT | Performed by: INTERNAL MEDICINE

## 2020-12-27 ENCOUNTER — HOSPITAL ENCOUNTER (OUTPATIENT)
Dept: BONE DENSITY | Age: 60
Discharge: HOME | End: 2020-12-27
Attending: INTERNAL MEDICINE
Payer: COMMERCIAL

## 2020-12-27 DIAGNOSIS — Z78.0 POSTMENOPAUSAL: ICD-10-CM

## 2020-12-27 PROCEDURE — 77080 DXA BONE DENSITY AXIAL: CPT | Performed by: INTERNAL MEDICINE

## 2020-12-28 PROBLEM — M81.0 AGE-RELATED OSTEOPOROSIS WITHOUT CURRENT PATHOLOGICAL FRACTURE: Status: ACTIVE | Noted: 2020-12-28

## 2020-12-29 ENCOUNTER — TELEPHONE (OUTPATIENT)
Dept: INTERNAL MEDICINE CLINIC | Facility: CLINIC | Age: 60
End: 2020-12-29

## 2020-12-29 DIAGNOSIS — M81.0 AGE-RELATED OSTEOPOROSIS WITHOUT CURRENT PATHOLOGICAL FRACTURE: Primary | ICD-10-CM

## 2020-12-29 NOTE — TELEPHONE ENCOUNTER
Pt called and wanted to inform Dr. Francisca Bryant about the intake of vitamins they take, more specifically there vitamin D intake. Pt stated they take 1,000 I.U./1 a day.  Pt also called to see what they could do to be ore proactive with their hip until they can be

## 2020-12-30 NOTE — TELEPHONE ENCOUNTER
Related to 12/27/2020 DEXA result note. See note below. Pt takes Vitamin D 1,000 units daily. Please advise if go ahead with order for Vitamin D level & other recommendations, thanks!

## 2020-12-31 ENCOUNTER — LAB ENCOUNTER (OUTPATIENT)
Dept: LAB | Age: 60
End: 2020-12-31
Attending: INTERNAL MEDICINE
Payer: COMMERCIAL

## 2020-12-31 PROCEDURE — 36415 COLL VENOUS BLD VENIPUNCTURE: CPT | Performed by: INTERNAL MEDICINE

## 2020-12-31 PROCEDURE — 82306 VITAMIN D 25 HYDROXY: CPT | Performed by: INTERNAL MEDICINE

## 2020-12-31 NOTE — TELEPHONE ENCOUNTER
Vit D ordered. Spoke to pt. Advised of test ordered. Scheduled pt for lab appt at [facility] lab at 2:30PM.    Darshan Courser to take extra precaution to prevent falling. [employer] message sent with OP resources. Pt voiced understanding.

## 2021-01-07 NOTE — TELEPHONE ENCOUNTER
Future Appointments   Date Time Provider Odalys Jefferson   1/8/2021 11:40 AM Yue Sutton MD [facility] [address] [place]     Patient has upcoming appointment tomorrow. She is on 1000 units of vitamin D daily.       Patient had blood work done on 12/31

## 2021-01-07 NOTE — TELEPHONE ENCOUNTER
Pt called and said that she is taking 25MCG once a day for Vitamin d. Pt also mentioned since she got blood work done she has hives on her chest and arms.  Sending high priority because hives/allergic reaction    Please advise    Thank you

## 2021-01-08 ENCOUNTER — OFFICE VISIT (OUTPATIENT)
Dept: INTERNAL MEDICINE CLINIC | Facility: CLINIC | Age: 61
End: 2021-01-08
Payer: COMMERCIAL

## 2021-01-08 VITALS
HEIGHT: 59.92 IN | TEMPERATURE: 97 F | RESPIRATION RATE: 14 BRPM | DIASTOLIC BLOOD PRESSURE: 82 MMHG | SYSTOLIC BLOOD PRESSURE: 130 MMHG | BODY MASS INDEX: 23.54 KG/M2 | WEIGHT: 119.88 LBS | HEART RATE: 93 BPM | OXYGEN SATURATION: 99 %

## 2021-01-08 DIAGNOSIS — R79.89 LOW VITAMIN D LEVEL: ICD-10-CM

## 2021-01-08 DIAGNOSIS — M81.0 AGE-RELATED OSTEOPOROSIS WITHOUT CURRENT PATHOLOGICAL FRACTURE: Primary | ICD-10-CM

## 2021-01-08 DIAGNOSIS — L20.84 INTRINSIC ECZEMA: ICD-10-CM

## 2021-01-08 PROCEDURE — 99214 OFFICE O/P EST MOD 30 MIN: CPT | Performed by: INTERNAL MEDICINE

## 2021-01-08 PROCEDURE — 3079F DIAST BP 80-89 MM HG: CPT | Performed by: INTERNAL MEDICINE

## 2021-01-08 PROCEDURE — 3075F SYST BP GE 130 - 139MM HG: CPT | Performed by: INTERNAL MEDICINE

## 2021-01-08 PROCEDURE — 3008F BODY MASS INDEX DOCD: CPT | Performed by: INTERNAL MEDICINE

## 2021-01-08 RX ORDER — BETAMETHASONE DIPROPIONATE 0.05 %
50 OINTMENT (GRAM) TOPICAL DAILY
Qty: 50 G | Refills: 0 | Status: SHIPPED | OUTPATIENT
Start: 2021-01-08 | End: 2021-05-11 | Stop reason: ALTCHOICE

## 2021-01-08 RX ORDER — GLUCOSAMINE HCL 500 MG
1 TABLET ORAL DAILY
COMMUNITY
Start: 2021-01-08

## 2021-01-08 RX ORDER — LATANOPROST 50 UG/ML
1 SOLUTION/ DROPS OPHTHALMIC DAILY
COMMUNITY
Start: 2020-12-19

## 2021-01-08 NOTE — PROGRESS NOTES
Luis Fields is a 61year old female  Patient presents with:  Test Results: Dexa  Rash: Underneath Both Armpits & Chest - Itchy, some blister-like bumps      HPI:   OP: T score -2.5 FN    Medications for osteoporosis:no    no history of fractures  no hei osteoporosis without current pathological fracture     Social History:  Social History    Tobacco Use      Smoking status: Former Smoker        Years: 20.00        Types: Cigarettes        Quit date: 10/17/2016        Years since quittin.2      Smokele Hip T-Score:  -1.1    % young normals:  85    % age matched controls:  80    Change from prior hip examination:  6.1*%           FEMORAL NECK ANALYSIS RESULTS:      Bone mineral density (BMD) (g/cm2):  0.569    Femoral neck T-Score:  -2.5    % young normal bilateral subglandular implants. There is bilateral capsular calcification. Post biopsy changes right breast. Stable parenchymal pattern both breasts.  No suspicious calcifications, spiculated masses or areas of architectural distortion in either breast.

## 2021-02-14 ENCOUNTER — APPOINTMENT (OUTPATIENT)
Dept: GENERAL RADIOLOGY | Age: 61
End: 2021-02-14
Attending: NURSE PRACTITIONER
Payer: COMMERCIAL

## 2021-02-14 ENCOUNTER — HOSPITAL ENCOUNTER (OUTPATIENT)
Age: 61
Discharge: HOME | End: 2021-02-14
Payer: COMMERCIAL

## 2021-02-14 VITALS
RESPIRATION RATE: 18 BRPM | SYSTOLIC BLOOD PRESSURE: 148 MMHG | HEART RATE: 82 BPM | TEMPERATURE: 98 F | DIASTOLIC BLOOD PRESSURE: 89 MMHG | WEIGHT: 119 LBS | HEIGHT: 60 IN | BODY MASS INDEX: 23.36 KG/M2 | OXYGEN SATURATION: 97 %

## 2021-02-14 DIAGNOSIS — S90.414A ABRASION OF FIFTH TOE OF RIGHT FOOT, INITIAL ENCOUNTER: ICD-10-CM

## 2021-02-14 DIAGNOSIS — S93.509A SPRAIN OF TOE, INITIAL ENCOUNTER: Primary | ICD-10-CM

## 2021-02-14 PROCEDURE — 99213 OFFICE O/P EST LOW 20 MIN: CPT | Performed by: NURSE PRACTITIONER

## 2021-02-14 PROCEDURE — 73660 X-RAY EXAM OF TOE(S): CPT | Performed by: NURSE PRACTITIONER

## 2021-02-14 PROCEDURE — 29550 STRAPPING OF TOES: CPT | Performed by: NURSE PRACTITIONER

## 2021-02-14 NOTE — ED INITIAL ASSESSMENT (HPI)
Pt c/o right 5th toe injury last night after accidentally kicking the foot board. Last Tetanus 2018.

## 2021-02-14 NOTE — ED PROVIDER NOTES
Patient Seen in: [facility]      History   Patient presents with:   Toe Injury    Stated Complaint: right foot pain from injury     HPI/Subjective:   HPI    25-year-old female presents for evaluation of pain to her right toe after acc SURGICAL HISTORY      uterine embolization   • TRANSFORAMINAL LUMBAR EPIDURAL STEROID INJECTION MULTIPLE LEVEL Right 3/3/2015    Performed by Cliff Fink MD at [address] - JAR(S): 6  08/31/2018                Social History    Ukine Status: She is alert.        ED Course   Labs Reviewed - No data to display       XR TOE(S) (MIN 2 VIEWS), RIGHT 5TH (CPT=73660) (Final result)  Result time 02/14/21 13:19:28  Final result by Eh Partida MD (02/14/21 13:19:28)                Kalina Tinoco

## 2021-02-28 LAB
ABSOLUTE BASOPHILS: 102 CELLS/UL (ref 0–200)
ABSOLUTE EOSINOPHILS: 211 CELLS/UL (ref 15–500)
ABSOLUTE LYMPHOCYTES: 932 CELLS/UL (ref 850–3900)
ABSOLUTE MONOCYTES: 605 CELLS/UL (ref 200–950)
ABSOLUTE NEUTROPHILS: 4950 CELLS/UL (ref 1500–7800)
ALBUMIN/GLOBULIN RATIO: 2 (CALC) (ref 1–2.5)
ALBUMIN: 4.4 G/DL (ref 3.6–5.1)
ALKALINE PHOSPHATASE: 42 U/L (ref 37–153)
ALT: 11 U/L (ref 6–29)
AST: 18 U/L (ref 10–35)
BASOPHILS: 1.5 %
BILIRUBIN, TOTAL: 0.4 MG/DL (ref 0.2–1.2)
BUN: 11 MG/DL (ref 7–25)
CALCIUM: 10.5 MG/DL (ref 8.6–10.4)
CARBON DIOXIDE: 27 MMOL/L (ref 20–32)
CHLORIDE: 104 MMOL/L (ref 98–110)
CHOL/HDLC RATIO: 3.2 (CALC)
CHOLESTEROL, TOTAL: 280 MG/DL
CREATININE: 0.94 MG/DL (ref 0.5–0.99)
EGFR IF AFRICN AM: 76 ML/MIN/1.73M2
EGFR IF NONAFRICN AM: 66 ML/MIN/1.73M2
EOSINOPHILS: 3.1 %
GLOBULIN: 2.2 G/DL (CALC) (ref 1.9–3.7)
GLUCOSE: 90 MG/DL (ref 65–99)
HDL CHOLESTEROL: 87 MG/DL
HEMATOCRIT: 42 % (ref 35–45)
HEMOGLOBIN: 13.8 G/DL (ref 11.7–15.5)
LDL-CHOLESTEROL: 174 MG/DL (CALC)
LYMPHOCYTES: 13.7 %
MCH: 29.1 PG (ref 27–33)
MCHC: 32.9 G/DL (ref 32–36)
MCV: 88.4 FL (ref 80–100)
MONOCYTES: 8.9 %
MPV: 9.5 FL (ref 7.5–12.5)
NEUTROPHILS: 72.8 %
NON-HDL CHOLESTEROL: 193 MG/DL (CALC)
PLATELET COUNT: 391 THOUSAND/UL (ref 140–400)
POTASSIUM: 4.2 MMOL/L (ref 3.5–5.3)
PROTEIN, TOTAL: 6.6 G/DL (ref 6.1–8.1)
RDW: 12.6 % (ref 11–15)
RED BLOOD CELL COUNT: 4.75 MILLION/UL (ref 3.8–5.1)
SODIUM: 140 MMOL/L (ref 135–146)
TRIGLYCERIDES: 80 MG/DL
VITAMIN D, 25-OH, TOTAL: 43 NG/ML (ref 30–100)
WHITE BLOOD CELL COUNT: 6.8 THOUSAND/UL (ref 3.8–10.8)

## 2021-03-03 DIAGNOSIS — R79.89 LOW VITAMIN D LEVEL: Primary | ICD-10-CM

## 2021-03-03 DIAGNOSIS — E78.00 HIGH CHOLESTEROL: ICD-10-CM

## 2021-03-03 NOTE — PROGRESS NOTES
Patient MyChart result sent, informed patient to stop taking calcium supplement for now and repeat calcium serum in 2 weeks and to hydrate well. Also advised her to continue with a low fat diet and exercise, repeat FLP in 6 months.  Labs have been ordered,

## 2021-03-25 ENCOUNTER — TELEPHONE (OUTPATIENT)
Dept: INTERNAL MEDICINE CLINIC | Facility: CLINIC | Age: 61
End: 2021-03-25

## 2021-03-25 DIAGNOSIS — E78.00 HIGH CHOLESTEROL: Primary | ICD-10-CM

## 2021-03-25 DIAGNOSIS — E83.52 SERUM CALCIUM ELEVATED: ICD-10-CM

## 2021-03-25 NOTE — TELEPHONE ENCOUNTER
Pt called and would like to have her CMP sent to [facility]. Pt would like to go on Sat to get them done. Please change the Lipid that is due in Sept also.    Thank you

## 2021-04-26 DIAGNOSIS — E78.00 HIGH CHOLESTEROL: Primary | ICD-10-CM

## 2021-05-11 ENCOUNTER — OFFICE VISIT (OUTPATIENT)
Dept: INTERNAL MEDICINE CLINIC | Facility: CLINIC | Age: 61
End: 2021-05-11
Payer: COMMERCIAL

## 2021-05-11 VITALS
DIASTOLIC BLOOD PRESSURE: 68 MMHG | RESPIRATION RATE: 14 BRPM | HEIGHT: 59.92 IN | HEART RATE: 86 BPM | BODY MASS INDEX: 23.5 KG/M2 | WEIGHT: 119.69 LBS | SYSTOLIC BLOOD PRESSURE: 116 MMHG | OXYGEN SATURATION: 98 % | TEMPERATURE: 98 F

## 2021-05-11 DIAGNOSIS — M54.2 CERVICALGIA: Primary | ICD-10-CM

## 2021-05-11 PROCEDURE — 3074F SYST BP LT 130 MM HG: CPT | Performed by: INTERNAL MEDICINE

## 2021-05-11 PROCEDURE — 3008F BODY MASS INDEX DOCD: CPT | Performed by: INTERNAL MEDICINE

## 2021-05-11 PROCEDURE — 3078F DIAST BP <80 MM HG: CPT | Performed by: INTERNAL MEDICINE

## 2021-05-11 PROCEDURE — 99213 OFFICE O/P EST LOW 20 MIN: CPT | Performed by: INTERNAL MEDICINE

## 2021-05-11 RX ORDER — CYCLOBENZAPRINE HCL 10 MG
10 TABLET ORAL NIGHTLY
Qty: 20 TABLET | Refills: 1 | Status: SHIPPED | OUTPATIENT
Start: 2021-05-11 | End: 2021-05-31

## 2021-05-11 NOTE — PROGRESS NOTES
Jong High is a 61year old female  Patient presents with:  Neck Pain: No Limits just Painful or \"Uncomfortable\"      HPI:   Right paracervical burning increasing throughout the course of the day for a few weeks  Can't get comfortable at night  Had i current pathological fracture        REVIEW OF SYSTEMS:   GENERAL HEALTH: feels well otherwise      EXAM:   /68 (BP Location: Right arm, Patient Position: Sitting, Cuff Size: adult)   Pulse 86   Temp 97.9 °F (36.6 °C) (Temporal)   Resp 14   Ht 4' 11.

## 2021-06-15 ENCOUNTER — TELEPHONE (OUTPATIENT)
Dept: PHYSICAL THERAPY | Facility: HOSPITAL | Age: 61
End: 2021-06-15

## 2021-06-21 ENCOUNTER — APPOINTMENT (OUTPATIENT)
Dept: PHYSICAL THERAPY | Facility: HOSPITAL | Age: 61
End: 2021-06-21
Attending: INTERNAL MEDICINE
Payer: COMMERCIAL

## 2021-06-22 ENCOUNTER — OFFICE VISIT (OUTPATIENT)
Dept: PHYSICAL THERAPY | Facility: HOSPITAL | Age: 61
End: 2021-06-22
Attending: INTERNAL MEDICINE
Payer: COMMERCIAL

## 2021-06-22 DIAGNOSIS — L90.0 LICHEN SCLEROSUS: ICD-10-CM

## 2021-06-22 DIAGNOSIS — Z12.31 ENCOUNTER FOR SCREENING MAMMOGRAM FOR BREAST CANCER: Primary | ICD-10-CM

## 2021-06-22 DIAGNOSIS — M54.2 CERVICALGIA: ICD-10-CM

## 2021-06-22 PROCEDURE — 97162 PT EVAL MOD COMPLEX 30 MIN: CPT

## 2021-06-22 PROCEDURE — 97110 THERAPEUTIC EXERCISES: CPT

## 2021-06-22 RX ORDER — CLOBETASOL PROPIONATE 0.5 MG/G
1 OINTMENT TOPICAL
Qty: 45 G | Refills: 2 | Status: SHIPPED | OUTPATIENT
Start: 2021-06-24 | End: 2021-12-27 | Stop reason: ALTCHOICE

## 2021-06-22 NOTE — PROGRESS NOTES
SPINE EVALUATION:   Referring Physician: Dr. Geovanny Erickson  Diagnosis: Cervicalgia (M54.2)     Date of Service: 6/22/2021     PATIENT Sherri Valdez is a 61year old female who presents to therapy today with complaints of neck pain going back to soft tissue tension in cervical paraspinals. Functional deficits include but are not limited to impaired ability to look over L shoulder. Signs and symptoms are consistent with diagnosis of mechanical neck pain.  Pt and PT discussed evaluation findings, p daily - 7 x weekly - 1 sets - 3 reps - 10 sec hold  Gentle Levator Scapulae Stretch - 1 x daily - 7 x weekly - 1 sets - 3 reps - 10 sec hold  Supine Chin Tuck - 1 x daily - 7 x weekly - 1 sets - 10 reps  Standing Row with Anchored Resistance - 1 x daily - under this plan of treatment and while under my care.     X___________________________________________________ Date____________________    Certification From: 2/75/2593  To:9/20/2021

## 2021-06-23 ENCOUNTER — APPOINTMENT (OUTPATIENT)
Dept: PHYSICAL THERAPY | Facility: HOSPITAL | Age: 61
End: 2021-06-23
Attending: INTERNAL MEDICINE
Payer: COMMERCIAL

## 2021-07-19 ENCOUNTER — APPOINTMENT (OUTPATIENT)
Dept: PHYSICAL THERAPY | Facility: HOSPITAL | Age: 61
End: 2021-07-19
Attending: INTERNAL MEDICINE
Payer: COMMERCIAL

## 2021-08-27 ENCOUNTER — OFFICE VISIT (OUTPATIENT)
Dept: INTERNAL MEDICINE CLINIC | Facility: CLINIC | Age: 61
End: 2021-08-27
Payer: COMMERCIAL

## 2021-08-27 VITALS
OXYGEN SATURATION: 97 % | BODY MASS INDEX: 23.4 KG/M2 | RESPIRATION RATE: 16 BRPM | HEIGHT: 59.92 IN | WEIGHT: 119.19 LBS | TEMPERATURE: 98 F | HEART RATE: 92 BPM | SYSTOLIC BLOOD PRESSURE: 122 MMHG | DIASTOLIC BLOOD PRESSURE: 62 MMHG

## 2021-08-27 DIAGNOSIS — L21.9 SEBORRHEIC DERMATITIS: ICD-10-CM

## 2021-08-27 DIAGNOSIS — L20.84 INTRINSIC ECZEMA: Primary | ICD-10-CM

## 2021-08-27 PROCEDURE — 99213 OFFICE O/P EST LOW 20 MIN: CPT | Performed by: INTERNAL MEDICINE

## 2021-08-27 PROCEDURE — 3078F DIAST BP <80 MM HG: CPT | Performed by: INTERNAL MEDICINE

## 2021-08-27 PROCEDURE — 3074F SYST BP LT 130 MM HG: CPT | Performed by: INTERNAL MEDICINE

## 2021-08-27 PROCEDURE — 3008F BODY MASS INDEX DOCD: CPT | Performed by: INTERNAL MEDICINE

## 2021-08-27 RX ORDER — IODINE/SODIUM IODIDE 2 %
1 TINCTURE TOPICAL EVERY 4 HOURS PRN
COMMUNITY

## 2021-08-27 RX ORDER — FEXOFENADINE HCL 180 MG/1
180 TABLET ORAL
COMMUNITY

## 2021-08-27 RX ORDER — METHYLPREDNISOLONE 4 MG/1
TABLET ORAL
Qty: 1 EACH | Refills: 0 | Status: SHIPPED | OUTPATIENT
Start: 2021-08-27 | End: 2021-12-27 | Stop reason: ALTCHOICE

## 2021-08-27 RX ORDER — BETAMETHASONE DIPROPIONATE 0.5 MG/G
1 CREAM TOPICAL 2 TIMES DAILY PRN
COMMUNITY
End: 2021-12-27 | Stop reason: ALTCHOICE

## 2021-08-27 RX ORDER — DIPHENHYDRAMINE HCL 25 MG
25 TABLET ORAL EVERY 6 HOURS PRN
COMMUNITY
End: 2021-12-27 | Stop reason: ALTCHOICE

## 2021-08-27 NOTE — PROGRESS NOTES
Hal Hook is a 64year old female  Patient presents with:  Rash: Since July 2021, All Over Body - Red, Raised, Itchy, Some Facial Swelling.  Blister-like flaky patches  Hives      HPI:   She has 2 kinds of rash starting last month  Intense itching and cigarettes per day    Vaping Use      Vaping Use: Never used    Alcohol use:  Yes      Alcohol/week: 4.0 standard drinks      Types: 4 Cans of beer per week      Comment: occassional    Drug use: No     Patient Active Problem List:     Asthma     GERD (edwin

## 2021-09-14 ENCOUNTER — MEDICAL CORRESPONDENCE (OUTPATIENT)
Dept: INTERNAL MEDICINE CLINIC | Facility: CLINIC | Age: 61
End: 2021-09-14

## 2021-10-06 ENCOUNTER — MED REC SCAN ONLY (OUTPATIENT)
Dept: INTERNAL MEDICINE CLINIC | Facility: CLINIC | Age: 61
End: 2021-10-06

## 2021-12-06 ENCOUNTER — TELEPHONE (OUTPATIENT)
Dept: INTERNAL MEDICINE CLINIC | Facility: CLINIC | Age: 61
End: 2021-12-06

## 2021-12-06 DIAGNOSIS — R21 RASH: Primary | ICD-10-CM

## 2021-12-06 NOTE — TELEPHONE ENCOUNTER
Pt was referred to see Dr Whelan Mention and Dr Tip Stewart told Pt to contact Dr Francisca Bryant for a recommendation for an allergist for Pts dermatitis and exzema.  Pt also needs a referral for allergist.   Please advise  Thank you

## 2021-12-07 NOTE — TELEPHONE ENCOUNTER
Spoke directly with Dr Bria Turner. Pt was seen recently for procedure for basal cell at clavicle, Dr will send procedure notes with addl note on bottom of what she recommended.    Pt continues to have rash but has not wanted to use the prescribed medication t

## 2021-12-08 ENCOUNTER — MED REC SCAN ONLY (OUTPATIENT)
Dept: INTERNAL MEDICINE CLINIC | Facility: CLINIC | Age: 61
End: 2021-12-08

## 2021-12-27 ENCOUNTER — OFFICE VISIT (OUTPATIENT)
Dept: INTERNAL MEDICINE CLINIC | Facility: CLINIC | Age: 61
End: 2021-12-27
Payer: COMMERCIAL

## 2021-12-27 VITALS
TEMPERATURE: 98 F | BODY MASS INDEX: 23.6 KG/M2 | RESPIRATION RATE: 14 BRPM | SYSTOLIC BLOOD PRESSURE: 112 MMHG | WEIGHT: 120.19 LBS | HEIGHT: 59.92 IN | OXYGEN SATURATION: 98 % | HEART RATE: 94 BPM | DIASTOLIC BLOOD PRESSURE: 70 MMHG

## 2021-12-27 DIAGNOSIS — R79.89 ELEVATED TSH: Primary | ICD-10-CM

## 2021-12-27 PROCEDURE — 3008F BODY MASS INDEX DOCD: CPT | Performed by: INTERNAL MEDICINE

## 2021-12-27 PROCEDURE — 3074F SYST BP LT 130 MM HG: CPT | Performed by: INTERNAL MEDICINE

## 2021-12-27 PROCEDURE — 99213 OFFICE O/P EST LOW 20 MIN: CPT | Performed by: INTERNAL MEDICINE

## 2021-12-27 PROCEDURE — 3078F DIAST BP <80 MM HG: CPT | Performed by: INTERNAL MEDICINE

## 2021-12-27 RX ORDER — ERGOCALCIFEROL (VITAMIN D2) 50 MCG
1 CAPSULE ORAL AS DIRECTED
COMMUNITY
End: 2021-12-27 | Stop reason: ALTCHOICE

## 2021-12-27 RX ORDER — ASPIRIN 81 MG/1
TABLET, CHEWABLE ORAL
COMMUNITY
End: 2021-12-27 | Stop reason: ALTCHOICE

## 2021-12-27 RX ORDER — MONTELUKAST SODIUM 10 MG/1
10 TABLET ORAL NIGHTLY
COMMUNITY
Start: 2021-12-09

## 2021-12-27 RX ORDER — DESONIDE 0.5 MG/G
CREAM TOPICAL AS NEEDED
COMMUNITY
Start: 2021-09-07

## 2021-12-27 RX ORDER — PREDNISONE 10 MG/1
10 TABLET ORAL DAILY
COMMUNITY
Start: 2021-12-16 | End: 2022-01-13 | Stop reason: ALTCHOICE

## 2021-12-27 NOTE — PROGRESS NOTES
Allie Henson is a 64year old female  Patient presents with:  Lab Results  Follow - Up: Multiple Issues      HPI:   She is on prednisone per allergy for dermatitis/ uriticaria and allergy did some testing and TSH reportedly per pt 6.480, T4 1.13  She has Age-related osteoporosis without current pathological fracture            EXAM:   /70 (BP Location: Left arm, Patient Position: Sitting, Cuff Size: adult)   Pulse 94   Temp 98 °F (36.7 °C) (Temporal)   Resp 14   Ht 4' 11.92\" (1.522 m)   Wt 120 lb 3.

## 2021-12-30 ENCOUNTER — TELEPHONE (OUTPATIENT)
Dept: INTERNAL MEDICINE CLINIC | Facility: CLINIC | Age: 61
End: 2021-12-30

## 2022-01-07 LAB
AMB EXT CHOLESTEROL, TOTAL: 274 MG/DL
AMB EXT HDL CHOLESTEROL: 93 MG/DL
AMB EXT LDL CHOLESTEROL, DIRECT: 167 MG/DL
AMB EXT TRIGLYCERIDES: 86 MG/DL
AMB EXT TSH: 3.19 MIU/ML
AMB EXT VLDL: 14 MG/DL

## 2022-01-08 ENCOUNTER — TELEPHONE (OUTPATIENT)
Dept: INTERNAL MEDICINE CLINIC | Facility: CLINIC | Age: 62
End: 2022-01-08

## 2022-01-08 NOTE — TELEPHONE ENCOUNTER
Incoming (mail or fax):  fax  Received from:  [facility]  Documentation given to:   Test result bin     Test results needed for upcoming appt

## 2022-01-10 NOTE — TELEPHONE ENCOUNTER
Received results for the following tests: FLP, TSH w/reflex to free T4 completed on 1/7/22 at [facility] as able. To Dr. Svetlana Akbar for review.     Pt has upcoming appt on 1/13/22

## 2022-01-12 ENCOUNTER — MED REC SCAN ONLY (OUTPATIENT)
Dept: INTERNAL MEDICINE CLINIC | Facility: CLINIC | Age: 62
End: 2022-01-12

## 2022-01-13 ENCOUNTER — OFFICE VISIT (OUTPATIENT)
Dept: INTERNAL MEDICINE CLINIC | Facility: CLINIC | Age: 62
End: 2022-01-13
Payer: COMMERCIAL

## 2022-01-13 VITALS
WEIGHT: 118.13 LBS | HEIGHT: 59.92 IN | DIASTOLIC BLOOD PRESSURE: 70 MMHG | OXYGEN SATURATION: 96 % | RESPIRATION RATE: 14 BRPM | BODY MASS INDEX: 23.19 KG/M2 | SYSTOLIC BLOOD PRESSURE: 118 MMHG | HEART RATE: 93 BPM | TEMPERATURE: 97 F

## 2022-01-13 DIAGNOSIS — E78.00 HIGH CHOLESTEROL: Primary | ICD-10-CM

## 2022-01-13 PROCEDURE — 3078F DIAST BP <80 MM HG: CPT | Performed by: INTERNAL MEDICINE

## 2022-01-13 PROCEDURE — 99213 OFFICE O/P EST LOW 20 MIN: CPT | Performed by: INTERNAL MEDICINE

## 2022-01-13 PROCEDURE — 3074F SYST BP LT 130 MM HG: CPT | Performed by: INTERNAL MEDICINE

## 2022-01-13 PROCEDURE — 3008F BODY MASS INDEX DOCD: CPT | Performed by: INTERNAL MEDICINE

## 2022-01-13 RX ORDER — LEVOCETIRIZINE DIHYDROCHLORIDE 5 MG/1
1 TABLET, FILM COATED ORAL 2 TIMES DAILY
COMMUNITY

## 2022-01-13 RX ORDER — FAMOTIDINE 20 MG/1
1 TABLET ORAL 2 TIMES DAILY
COMMUNITY

## 2022-01-13 NOTE — PROGRESS NOTES
Kofi Rodriguez is a 64year old female.  To F/U from last visit regarding high chol and TSH  HPI:    Interim history:repeat TSH normalized  Her chol/LDL is 274/167  Her 10 year risk for major CV risk: is 2.6 %    Discussion w/pt regarding 10 year risk,   I

## 2022-06-23 ENCOUNTER — OFFICE VISIT (OUTPATIENT)
Dept: INTERNAL MEDICINE CLINIC | Facility: CLINIC | Age: 62
End: 2022-06-23
Payer: COMMERCIAL

## 2022-06-23 VITALS
WEIGHT: 119.38 LBS | HEIGHT: 60.24 IN | SYSTOLIC BLOOD PRESSURE: 112 MMHG | RESPIRATION RATE: 18 BRPM | DIASTOLIC BLOOD PRESSURE: 66 MMHG | BODY MASS INDEX: 23.13 KG/M2 | OXYGEN SATURATION: 97 % | TEMPERATURE: 99 F | HEART RATE: 92 BPM

## 2022-06-23 DIAGNOSIS — Z00.00 ROUTINE GENERAL MEDICAL EXAMINATION AT A HEALTH CARE FACILITY: Primary | ICD-10-CM

## 2022-06-23 DIAGNOSIS — Z12.31 ENCOUNTER FOR SCREENING MAMMOGRAM FOR BREAST CANCER: ICD-10-CM

## 2022-06-23 PROCEDURE — 99396 PREV VISIT EST AGE 40-64: CPT | Performed by: INTERNAL MEDICINE

## 2022-06-23 PROCEDURE — 3078F DIAST BP <80 MM HG: CPT | Performed by: INTERNAL MEDICINE

## 2022-06-23 PROCEDURE — 3008F BODY MASS INDEX DOCD: CPT | Performed by: INTERNAL MEDICINE

## 2022-06-23 PROCEDURE — 3074F SYST BP LT 130 MM HG: CPT | Performed by: INTERNAL MEDICINE

## 2022-08-06 ENCOUNTER — HOSPITAL ENCOUNTER (OUTPATIENT)
Dept: MAMMOGRAPHY | Age: 62
Discharge: HOME | End: 2022-08-06
Attending: INTERNAL MEDICINE
Payer: COMMERCIAL

## 2022-08-06 DIAGNOSIS — Z12.31 ENCOUNTER FOR SCREENING MAMMOGRAM FOR BREAST CANCER: ICD-10-CM

## 2022-08-06 PROCEDURE — 77067 SCR MAMMO BI INCL CAD: CPT | Performed by: INTERNAL MEDICINE

## 2022-08-06 PROCEDURE — 77063 BREAST TOMOSYNTHESIS BI: CPT | Performed by: INTERNAL MEDICINE

## 2022-11-22 ENCOUNTER — OFFICE VISIT (OUTPATIENT)
Dept: INTERNAL MEDICINE CLINIC | Facility: CLINIC | Age: 62
End: 2022-11-22
Payer: COMMERCIAL

## 2022-11-22 VITALS
HEART RATE: 78 BPM | RESPIRATION RATE: 14 BRPM | SYSTOLIC BLOOD PRESSURE: 112 MMHG | WEIGHT: 119.19 LBS | DIASTOLIC BLOOD PRESSURE: 64 MMHG | TEMPERATURE: 97 F | BODY MASS INDEX: 23.09 KG/M2 | OXYGEN SATURATION: 98 % | HEIGHT: 60.24 IN

## 2022-11-22 DIAGNOSIS — E78.00 HIGH CHOLESTEROL: ICD-10-CM

## 2022-11-22 DIAGNOSIS — R20.0 NUMBNESS AND TINGLING OF FOOT: ICD-10-CM

## 2022-11-22 DIAGNOSIS — R20.2 NUMBNESS AND TINGLING OF FOOT: ICD-10-CM

## 2022-11-22 DIAGNOSIS — Z00.00 ENCOUNTER FOR ANNUAL PHYSICAL EXAM: ICD-10-CM

## 2022-11-22 DIAGNOSIS — M79.89 RIGHT LEG SWELLING: Primary | ICD-10-CM

## 2022-11-22 PROCEDURE — 99214 OFFICE O/P EST MOD 30 MIN: CPT | Performed by: NURSE PRACTITIONER

## 2022-11-22 PROCEDURE — 3008F BODY MASS INDEX DOCD: CPT | Performed by: NURSE PRACTITIONER

## 2022-11-22 PROCEDURE — 3074F SYST BP LT 130 MM HG: CPT | Performed by: NURSE PRACTITIONER

## 2022-11-22 PROCEDURE — 3078F DIAST BP <80 MM HG: CPT | Performed by: NURSE PRACTITIONER

## 2022-11-22 NOTE — PATIENT INSTRUCTIONS
Get your labs done. You should be fasting for at least 10 hours. If you take a multivitamin with Biotin or any biotin product it should be held for 3 days prior to getting your labs done. If you use [facility] labs, you may schedule at [phone] or on-line at [facility]. Apply ice to the right leg 2-3 times daily. Wear compression stockings during the day.

## 2022-11-26 LAB
ABSOLUTE BASOPHILS: 72 CELLS/UL (ref 0–200)
ABSOLUTE EOSINOPHILS: 396 CELLS/UL (ref 15–500)
ABSOLUTE LYMPHOCYTES: 1242 CELLS/UL (ref 850–3900)
ABSOLUTE MONOCYTES: 588 CELLS/UL (ref 200–950)
ABSOLUTE NEUTROPHILS: 3702 CELLS/UL (ref 1500–7800)
ALBUMIN/GLOBULIN RATIO: 1.8 (CALC) (ref 1–2.5)
ALBUMIN: 4.2 G/DL (ref 3.6–5.1)
ALKALINE PHOSPHATASE: 42 U/L (ref 37–153)
ALT: 15 U/L (ref 6–29)
AST: 17 U/L (ref 10–35)
BASOPHILS: 1.2 %
BILIRUBIN, TOTAL: 0.4 MG/DL (ref 0.2–1.2)
BUN: 23 MG/DL (ref 7–25)
CALCIUM: 9.3 MG/DL (ref 8.6–10.4)
CARBON DIOXIDE: 27 MMOL/L (ref 20–32)
CHLORIDE: 108 MMOL/L (ref 98–110)
CHOL/HDLC RATIO: 3.4 (CALC)
CHOLESTEROL, TOTAL: 269 MG/DL
CREATININE: 0.93 MG/DL (ref 0.5–1.05)
EGFR: 69 ML/MIN/1.73M2
EOSINOPHILS: 6.6 %
GLOBULIN: 2.4 G/DL (CALC) (ref 1.9–3.7)
GLUCOSE: 88 MG/DL (ref 65–99)
HDL CHOLESTEROL: 79 MG/DL
HEMATOCRIT: 42.8 % (ref 35–45)
HEMOGLOBIN: 14.1 G/DL (ref 11.7–15.5)
LDL-CHOLESTEROL: 172 MG/DL (CALC)
LYMPHOCYTES: 20.7 %
MCH: 28.5 PG (ref 27–33)
MCHC: 32.9 G/DL (ref 32–36)
MCV: 86.6 FL (ref 80–100)
MONOCYTES: 9.8 %
MPV: 9.6 FL (ref 7.5–12.5)
NEUTROPHILS: 61.7 %
NON-HDL CHOLESTEROL: 190 MG/DL (CALC)
PLATELET COUNT: 392 THOUSAND/UL (ref 140–400)
POTASSIUM: 4.2 MMOL/L (ref 3.5–5.3)
PROTEIN, TOTAL: 6.6 G/DL (ref 6.1–8.1)
RDW: 12.8 % (ref 11–15)
RED BLOOD CELL COUNT: 4.94 MILLION/UL (ref 3.8–5.1)
SODIUM: 142 MMOL/L (ref 135–146)
TRIGLYCERIDES: 78 MG/DL
TSH W/REFLEX TO FT4: 2.81 MIU/L (ref 0.4–4.5)
VITAMIN B12: 352 PG/ML (ref 200–1100)
WHITE BLOOD CELL COUNT: 6 THOUSAND/UL (ref 3.8–10.8)

## 2023-04-28 DIAGNOSIS — M85.80 OSTEOPENIA, UNSPECIFIED LOCATION: ICD-10-CM

## 2023-04-28 DIAGNOSIS — Z12.31 ENCOUNTER FOR SCREENING MAMMOGRAM FOR BREAST CANCER: ICD-10-CM

## 2023-04-28 DIAGNOSIS — Z13.820 OSTEOPOROSIS SCREENING: Primary | ICD-10-CM

## 2023-08-19 ENCOUNTER — HOSPITAL ENCOUNTER (OUTPATIENT)
Dept: MAMMOGRAPHY | Facility: HOSPITAL | Age: 63
Discharge: HOME | End: 2023-08-19
Attending: OBSTETRICS & GYNECOLOGY
Payer: COMMERCIAL

## 2023-08-19 DIAGNOSIS — Z12.31 ENCOUNTER FOR SCREENING MAMMOGRAM FOR BREAST CANCER: ICD-10-CM

## 2023-08-19 PROCEDURE — 77063 BREAST TOMOSYNTHESIS BI: CPT | Performed by: OBSTETRICS & GYNECOLOGY

## 2023-08-19 PROCEDURE — 77067 SCR MAMMO BI INCL CAD: CPT | Performed by: OBSTETRICS & GYNECOLOGY

## 2023-09-25 PROBLEM — D12.2 BENIGN NEOPLASM OF ASCENDING COLON: Status: ACTIVE | Noted: 2023-09-25

## 2023-09-25 PROBLEM — Z86.0101 HISTORY OF ADENOMATOUS POLYP OF COLON: Status: ACTIVE | Noted: 2023-09-25

## 2023-09-25 PROBLEM — Z86.010 HISTORY OF ADENOMATOUS POLYP OF COLON: Status: ACTIVE | Noted: 2023-09-25

## 2023-09-25 PROBLEM — Z80.0 FAMILY HISTORY OF COLON CANCER: Status: ACTIVE | Noted: 2023-09-25

## 2024-06-27 ENCOUNTER — HOSPITAL ENCOUNTER (OUTPATIENT)
Age: 64
Discharge: HOME | End: 2024-06-27

## 2024-06-27 VITALS
BODY MASS INDEX: 22.66 KG/M2 | HEIGHT: 61 IN | SYSTOLIC BLOOD PRESSURE: 123 MMHG | WEIGHT: 120 LBS | HEART RATE: 83 BPM | DIASTOLIC BLOOD PRESSURE: 70 MMHG | RESPIRATION RATE: 19 BRPM | TEMPERATURE: 99 F | OXYGEN SATURATION: 97 %

## 2024-06-27 DIAGNOSIS — T63.441A LOCAL REACTION TO BEE STING, ACCIDENTAL OR UNINTENTIONAL, INITIAL ENCOUNTER: Primary | ICD-10-CM

## 2024-06-27 PROCEDURE — 99213 OFFICE O/P EST LOW 20 MIN: CPT | Performed by: PHYSICIAN ASSISTANT

## 2024-06-27 RX ORDER — CEPHALEXIN 500 MG/1
500 CAPSULE ORAL 4 TIMES DAILY
Qty: 40 CAPSULE | Refills: 0 | Status: SHIPPED | OUTPATIENT
Start: 2024-06-27 | End: 2024-07-07

## 2024-06-27 RX ORDER — PREDNISONE 20 MG/1
40 TABLET ORAL DAILY
Qty: 10 TABLET | Refills: 0 | Status: SHIPPED | OUTPATIENT
Start: 2024-06-27 | End: 2024-07-02

## 2024-06-27 RX ORDER — LATANOPROST 50 UG/ML
SOLUTION/ DROPS OPHTHALMIC
COMMUNITY
Start: 2024-03-26

## 2024-06-27 NOTE — DISCHARGE INSTRUCTIONS
Continue to take Zyrtec and/or Benadryl as needed  Take the prednisone daily until gone  Out of caution start the Keflex in case of a secondary skin infection  Your last tetanus was in 2018 for your record  Follow-up with primary care doctor in 48 hours for wound recheck  Return to the ER symptoms worsen
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
## 2024-10-31 ENCOUNTER — OFFICE VISIT (OUTPATIENT)
Dept: INTERNAL MEDICINE CLINIC | Facility: CLINIC | Age: 64
End: 2024-10-31
Payer: COMMERCIAL

## 2024-10-31 VITALS
OXYGEN SATURATION: 97 % | RESPIRATION RATE: 16 BRPM | WEIGHT: 116.19 LBS | BODY MASS INDEX: 22.81 KG/M2 | HEART RATE: 75 BPM | HEIGHT: 59.76 IN | SYSTOLIC BLOOD PRESSURE: 110 MMHG | TEMPERATURE: 98 F | DIASTOLIC BLOOD PRESSURE: 68 MMHG

## 2024-10-31 DIAGNOSIS — Z13.29 SCREENING FOR THYROID DISORDER: ICD-10-CM

## 2024-10-31 DIAGNOSIS — Z13.0 SCREENING FOR ENDOCRINE, METABOLIC AND IMMUNITY DISORDER: ICD-10-CM

## 2024-10-31 DIAGNOSIS — Z13.228 SCREENING FOR ENDOCRINE, METABOLIC AND IMMUNITY DISORDER: ICD-10-CM

## 2024-10-31 DIAGNOSIS — Z13.29 SCREENING FOR ENDOCRINE, METABOLIC AND IMMUNITY DISORDER: ICD-10-CM

## 2024-10-31 DIAGNOSIS — M81.0 AGE-RELATED OSTEOPOROSIS WITHOUT CURRENT PATHOLOGICAL FRACTURE: ICD-10-CM

## 2024-10-31 DIAGNOSIS — Z13.0 SCREENING FOR DEFICIENCY ANEMIA: ICD-10-CM

## 2024-10-31 DIAGNOSIS — Z00.00 ENCOUNTER FOR ANNUAL PHYSICAL EXAM: Primary | ICD-10-CM

## 2024-10-31 DIAGNOSIS — J45.20 MILD INTERMITTENT ASTHMA WITHOUT COMPLICATION (HCC): ICD-10-CM

## 2024-10-31 DIAGNOSIS — E78.00 HIGH CHOLESTEROL: ICD-10-CM

## 2024-10-31 DIAGNOSIS — Z13.1 SCREENING FOR DIABETES MELLITUS: ICD-10-CM

## 2024-10-31 PROCEDURE — 99396 PREV VISIT EST AGE 40-64: CPT | Performed by: NURSE PRACTITIONER

## 2024-10-31 NOTE — PATIENT INSTRUCTIONS
Get your labs done. You should be fasting for at least 10 hours (you can drink water during fasting). If you take a multivitamin with Biotin or any biotin product it should be held for 3 days prior to getting your labs done. If you use [facility] labs, you may schedule at [phone] or on-line at [email or web address].     See your gynecologist and get pap and mammogram done.
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
## 2024-11-09 ENCOUNTER — LAB ENCOUNTER (OUTPATIENT)
Dept: LAB | Age: 64
End: 2024-11-09
Attending: NURSE PRACTITIONER
Payer: COMMERCIAL

## 2024-11-09 LAB
ALBUMIN SERPL-MCNC: 4.3 G/DL (ref 3.2–4.8)
ALBUMIN/GLOB SERPL: 1.4 {RATIO} (ref 1–2)
ALP LIVER SERPL-CCNC: 43 U/L
ALT SERPL-CCNC: 11 U/L
ANION GAP SERPL CALC-SCNC: 2 MMOL/L (ref 0–18)
AST SERPL-CCNC: 27 U/L (ref ?–34)
BASOPHILS # BLD AUTO: 0.06 X10(3) UL (ref 0–0.2)
BASOPHILS NFR BLD AUTO: 1.3 %
BILIRUB SERPL-MCNC: 0.4 MG/DL (ref 0.2–1.1)
BUN BLD-MCNC: 18 MG/DL (ref 9–23)
CALCIUM BLD-MCNC: 10.3 MG/DL (ref 8.7–10.4)
CHLORIDE SERPL-SCNC: 106 MMOL/L (ref 98–112)
CHOLEST SERPL-MCNC: 257 MG/DL (ref ?–200)
CO2 SERPL-SCNC: 32 MMOL/L (ref 21–32)
CREAT BLD-MCNC: 0.93 MG/DL
EGFRCR SERPLBLD CKD-EPI 2021: 69 ML/MIN/1.73M2 (ref 60–?)
EOSINOPHIL # BLD AUTO: 0.16 X10(3) UL (ref 0–0.7)
EOSINOPHIL NFR BLD AUTO: 3.4 %
ERYTHROCYTE [DISTWIDTH] IN BLOOD BY AUTOMATED COUNT: 13 %
EST. AVERAGE GLUCOSE BLD GHB EST-MCNC: 114 MG/DL (ref 68–126)
FASTING PATIENT LIPID ANSWER: YES
FASTING STATUS PATIENT QL REPORTED: YES
GLOBULIN PLAS-MCNC: 3 G/DL (ref 2–3.5)
GLUCOSE BLD-MCNC: 87 MG/DL (ref 70–99)
HBA1C MFR BLD: 5.6 % (ref ?–5.7)
HCT VFR BLD AUTO: 44.3 %
HDLC SERPL-MCNC: 77 MG/DL (ref 40–59)
HGB BLD-MCNC: 14.3 G/DL
IMM GRANULOCYTES # BLD AUTO: 0.01 X10(3) UL (ref 0–1)
IMM GRANULOCYTES NFR BLD: 0.2 %
LDLC SERPL CALC-MCNC: 166 MG/DL (ref ?–100)
LYMPHOCYTES # BLD AUTO: 1.07 X10(3) UL (ref 1–4)
LYMPHOCYTES NFR BLD AUTO: 22.6 %
MCH RBC QN AUTO: 28.9 PG (ref 26–34)
MCHC RBC AUTO-ENTMCNC: 32.3 G/DL (ref 31–37)
MCV RBC AUTO: 89.7 FL
MONOCYTES # BLD AUTO: 0.5 X10(3) UL (ref 0.1–1)
MONOCYTES NFR BLD AUTO: 10.5 %
NEUTROPHILS # BLD AUTO: 2.94 X10 (3) UL (ref 1.5–7.7)
NEUTROPHILS # BLD AUTO: 2.94 X10(3) UL (ref 1.5–7.7)
NEUTROPHILS NFR BLD AUTO: 62 %
NONHDLC SERPL-MCNC: 180 MG/DL (ref ?–130)
OSMOLALITY SERPL CALC.SUM OF ELEC: 291 MOSM/KG (ref 275–295)
PLATELET # BLD AUTO: 388 10(3)UL (ref 150–450)
POTASSIUM SERPL-SCNC: 4.1 MMOL/L (ref 3.5–5.1)
PROT SERPL-MCNC: 7.3 G/DL (ref 5.7–8.2)
RBC # BLD AUTO: 4.94 X10(6)UL
SODIUM SERPL-SCNC: 140 MMOL/L (ref 136–145)
TRIGL SERPL-MCNC: 86 MG/DL (ref 30–149)
TSI SER-ACNC: 2.82 UIU/ML (ref 0.55–4.78)
VLDLC SERPL CALC-MCNC: 17 MG/DL (ref 0–30)
WBC # BLD AUTO: 4.7 X10(3) UL (ref 4–11)

## 2024-11-09 PROCEDURE — 36415 COLL VENOUS BLD VENIPUNCTURE: CPT | Performed by: NURSE PRACTITIONER

## 2024-11-09 PROCEDURE — 85025 COMPLETE CBC W/AUTO DIFF WBC: CPT | Performed by: NURSE PRACTITIONER

## 2024-11-09 PROCEDURE — 84443 ASSAY THYROID STIM HORMONE: CPT | Performed by: NURSE PRACTITIONER

## 2024-11-09 PROCEDURE — 83036 HEMOGLOBIN GLYCOSYLATED A1C: CPT | Performed by: NURSE PRACTITIONER

## 2024-11-09 PROCEDURE — 80061 LIPID PANEL: CPT | Performed by: NURSE PRACTITIONER

## 2024-11-09 PROCEDURE — 80053 COMPREHEN METABOLIC PANEL: CPT | Performed by: NURSE PRACTITIONER

## 2025-05-03 ENCOUNTER — HOSPITAL ENCOUNTER (OUTPATIENT)
Dept: MAMMOGRAPHY | Facility: HOSPITAL | Age: 65
Discharge: HOME | End: 2025-05-03
Attending: OBSTETRICS & GYNECOLOGY
Payer: COMMERCIAL

## 2025-05-03 DIAGNOSIS — Z12.31 ENCOUNTER FOR SCREENING MAMMOGRAM FOR MALIGNANT NEOPLASM OF BREAST: ICD-10-CM

## 2025-05-03 PROCEDURE — 77067 SCR MAMMO BI INCL CAD: CPT | Performed by: OBSTETRICS & GYNECOLOGY

## 2025-05-03 PROCEDURE — 77063 BREAST TOMOSYNTHESIS BI: CPT | Performed by: OBSTETRICS & GYNECOLOGY

## (undated) NOTE — LETTER
ASTHMA ACTION PLAN for Shoshana Santillan     : 1960     Date: 10/31/2024  Provider:  LAMBERTO Mills  Phone for doctor or clinic: [facility], [facility], [place]  [address]  [facility] [id number] [phone]    ACT Score: 25      You can use the colors of a traffic light to help learn about your asthma medicines.      1. Green - Go! % of Personal Best Peak Flow Use controller medicine.   Breathing is good  No cough or wheeze  Can work and play Medicine How much to take When to take it      No Regularly Scheduled Daily Medications.        2. Yellow - Caution. 50-79% Personal Best Peak  Flow.  Use reliever medicine to keep an asthma attack from getting bad.   Cough  Wheezing  Tight Chest  Wake up at night Medicine How much to take When to take it      Albuterol Rescue Inhaler,  2 puffs every 6 hours as needed.         Additional instructions   CONTACT DR'S OFFICE IF SYMPTOMS LAST MORE THAN 24-48 HRS.        3. Red - Stop! Danger!  <50% Personal Best Peak  Flow. Take these medications until  Get help from a doctor   Medicine not helping  Breathing is hard and fast  Nose opens wide  Can't walk  Ribs show  Can't talk well Medicine How much to take When to take it        Albuterol Rescue Inhaler, 1 - 2 puffs every 10 minutes as needed while on the way to the Emergency Room!!       Additional Instructions                   CALL 911 IF NEEDED!!     DO NOT DRIVE YOURSELF!!     [] Asthma Action Plan reviewed with patient (and caregiver if necessary) and a copy of the plan was given to the patient/caregiver.   [x] Asthma Action Plan reviewed with patient (and caregiver if necessary) on the phone and mailed copy to patient or submitted via [employer].     Signatures:  Provider  LAMBERTO Mills   Patient Caretaker

## (undated) NOTE — LETTER
ASTHMA ACTION PLAN for Richard Kamara     : 1960     Date: 2017  Provider:  Elizabeth Sprague MD  Phone for doctor or clinic: [facility],  [address] [place] [address], [address] [place] [address] [place] [phone] 25 Minerva Olguin MD   Patient Caretaker

## (undated) NOTE — Clinical Note
ASTHMA ACTION PLAN for Marino Arnold     : 1960     Date: 2017  Provider:  Merry Lubin MD  Phone for doctor or clinic: [address] [place] [address],  [address] [place] [phone]

## (undated) NOTE — LETTER
Date & Time: 6/27/2024, 4:24 PM  Patient: Shoshana Santillan  Encounter Provider(s):    Love Pineda PA       To Whom It May Concern:    Shoshana Santillan was seen and treated in our department on 6/27/2024. She should not return to work until 06/29/24 .    If you have any questions or concerns, please do not hesitate to call.        _____________________________  Physician/APC Signature

## (undated) NOTE — LETTER
September 21, 2017    [place] [id number]      Dear Surinder Bedolla: The following are the results of your recent tests.      Your pap smear is normal.      Please call if you have further questions,    Dr Mae Edmond

## (undated) NOTE — LETTER
ASTHMA ACTION PLAN for Luis Fields     : 1960     Date: 2018  Provider:  July Winchester MD  Phone for doctor or clinic: [facility],  [address] [place] [phone]

## (undated) NOTE — LETTER
01/11/21        [place] [id number]      Dear Luz Quevedo,    This is a friendly reminder to let you know you have outstanding lab work as listed below.   To complete these labs, please contact your provider of choice to schedule

## (undated) NOTE — MR AVS SNAPSHOT
[address] [address] [phone]               Thank you for choosing us for your health care visit with Minerva Olguin MD.  We are glad to serve you and happy to provid Pseudoephedrine-Guaifenesin -1200 MG Tb12   Take 1 tablet by mouth 2 (two) times daily. Commonly known as:  MUCINEX D           vitamin C 1000 MG Tabs   Take 1,000 mg by mouth daily.            [employer] Lali Amezquita